# Patient Record
Sex: FEMALE | Race: WHITE | NOT HISPANIC OR LATINO | Employment: OTHER | URBAN - NONMETROPOLITAN AREA
[De-identification: names, ages, dates, MRNs, and addresses within clinical notes are randomized per-mention and may not be internally consistent; named-entity substitution may affect disease eponyms.]

---

## 2020-05-28 ENCOUNTER — HISTORICAL (OUTPATIENT)
Dept: ADMINISTRATIVE | Facility: HOSPITAL | Age: 75
End: 2020-05-28

## 2020-05-28 LAB
ERYTHROCYTE [DISTWIDTH] IN BLOOD BY AUTOMATED COUNT: 12.2 % (ref 11.5–14.5)
ESTRADIOL: 78.7 PG/ML
FSH SERPL-ACNC: 3.5 MIU/ML
HCT VFR BLD AUTO: 41 % (ref 37.7–47.9)
HGB BLD-MCNC: 13.6 G/DL (ref 12.2–16.2)
MCH RBC QN AUTO: 29.4 PG (ref 27–31)
MCHC RBC AUTO-ENTMCNC: 33.2 G% (ref 32–35)
MCV RBC AUTO: 88.7 FL (ref 80–97)
PMV BLD AUTO: 274 10 (ref 142–424)
PMV BLD AUTO: 8.7 FL (ref 7.4–10.4)
RBC # BLD AUTO: 4.62 M/UL (ref 4.04–5.48)
T3FREE SP1 P CHAL SERPL-SCNC: 2.81 PG/ML (ref 2.18–3.98)
T4,THYROXINE: 7.9 UG/DL (ref 4.8–13.9)
TESTOST SERPL-MCNC: 244 NG/DL (ref 3–41)
TSH SERPL DL<=0.005 MIU/L-ACNC: 1.51 UIU/ML (ref 0.36–3.74)
WBC # BLD AUTO: 7.9 10 (ref 4.6–10.2)

## 2020-05-29 LAB — THYROPEROXIDASE ANTIBODIES: <9 IU/ML (ref 0–34)

## 2020-09-22 ENCOUNTER — LAB VISIT (OUTPATIENT)
Dept: LAB | Facility: HOSPITAL | Age: 75
End: 2020-09-22
Attending: FAMILY MEDICINE
Payer: MEDICARE

## 2020-09-22 DIAGNOSIS — E66.8 OBESITY OF ENDOCRINE ORIGIN: Primary | ICD-10-CM

## 2020-09-22 DIAGNOSIS — R53.83 FATIGUE: ICD-10-CM

## 2020-09-22 LAB
BASOPHILS # BLD AUTO: 0.04 K/UL (ref 0–0.2)
BASOPHILS NFR BLD: 0.6 % (ref 0–1.9)
DIFFERENTIAL METHOD: ABNORMAL
EOSINOPHIL # BLD AUTO: 0.1 K/UL (ref 0–0.5)
EOSINOPHIL NFR BLD: 1.7 % (ref 0–8)
ERYTHROCYTE [DISTWIDTH] IN BLOOD BY AUTOMATED COUNT: 12.1 % (ref 11.5–14.5)
HCT VFR BLD AUTO: 39.8 % (ref 37–48.5)
HGB BLD-MCNC: 12.7 G/DL (ref 12–16)
IMM GRANULOCYTES # BLD AUTO: 0.01 K/UL (ref 0–0.04)
IMM GRANULOCYTES NFR BLD AUTO: 0.1 % (ref 0–0.5)
LYMPHOCYTES # BLD AUTO: 1.8 K/UL (ref 1–4.8)
LYMPHOCYTES NFR BLD: 24.6 % (ref 18–48)
MCH RBC QN AUTO: 28.5 PG (ref 27–31)
MCHC RBC AUTO-ENTMCNC: 31.9 G/DL (ref 32–36)
MCV RBC AUTO: 89 FL (ref 82–98)
MONOCYTES # BLD AUTO: 0.7 K/UL (ref 0.3–1)
MONOCYTES NFR BLD: 9.6 % (ref 4–15)
NEUTROPHILS # BLD AUTO: 4.5 K/UL (ref 1.8–7.7)
NEUTROPHILS NFR BLD: 63.4 % (ref 38–73)
NRBC BLD-RTO: 0 /100 WBC
PLATELET # BLD AUTO: 277 K/UL (ref 150–350)
PMV BLD AUTO: 8.6 FL (ref 9.2–12.9)
RBC # BLD AUTO: 4.46 M/UL (ref 4–5.4)
T4 SERPL-MCNC: 9.1 UG/DL (ref 4.5–11.5)
TSH SERPL DL<=0.005 MIU/L-ACNC: 0.62 UIU/ML (ref 0.4–4)
WBC # BLD AUTO: 7.16 K/UL (ref 3.9–12.7)

## 2020-09-22 PROCEDURE — 84436 ASSAY OF TOTAL THYROXINE: CPT

## 2020-09-22 PROCEDURE — 83001 ASSAY OF GONADOTROPIN (FSH): CPT

## 2020-09-22 PROCEDURE — 85025 COMPLETE CBC W/AUTO DIFF WBC: CPT

## 2020-09-22 PROCEDURE — 82670 ASSAY OF TOTAL ESTRADIOL: CPT

## 2020-09-22 PROCEDURE — 84481 FREE ASSAY (FT-3): CPT

## 2020-09-22 PROCEDURE — 84443 ASSAY THYROID STIM HORMONE: CPT

## 2020-09-22 PROCEDURE — 84403 ASSAY OF TOTAL TESTOSTERONE: CPT

## 2020-09-22 PROCEDURE — 36415 COLL VENOUS BLD VENIPUNCTURE: CPT

## 2020-09-23 LAB
ESTRADIOL SERPL-MCNC: 46 PG/ML
FSH SERPL-ACNC: 5.7 MIU/ML
T3FREE SERPL-MCNC: 2.4 PG/ML (ref 2.3–4.2)
TESTOST SERPL-MCNC: 133 NG/DL (ref 5–73)
THYROPEROXIDASE IGG SERPL-ACNC: <6 IU/ML

## 2020-10-16 ENCOUNTER — HOSPITAL ENCOUNTER (INPATIENT)
Facility: HOSPITAL | Age: 75
LOS: 4 days | Discharge: HOME | DRG: 885 | End: 2020-10-20
Attending: EMERGENCY MEDICINE | Admitting: EMERGENCY MEDICINE
Payer: MEDICARE

## 2020-10-16 DIAGNOSIS — F32.A DEPRESSION, UNSPECIFIED DEPRESSION TYPE: Primary | ICD-10-CM

## 2020-10-16 DIAGNOSIS — F23 ACUTE PSYCHOSIS: ICD-10-CM

## 2020-10-16 LAB
ALBUMIN SERPL BCP-MCNC: 4.1 G/DL (ref 3.5–5.2)
ALP SERPL-CCNC: 62 U/L (ref 55–135)
ALT SERPL W/O P-5'-P-CCNC: 22 U/L (ref 10–44)
AMPHET+METHAMPHET UR QL: NEGATIVE
ANION GAP SERPL CALC-SCNC: 8 MMOL/L (ref 8–16)
APAP SERPL-MCNC: <10 UG/ML (ref 10–20)
AST SERPL-CCNC: 15 U/L (ref 10–40)
BACTERIA #/AREA URNS HPF: ABNORMAL /HPF
BARBITURATES UR QL SCN>200 NG/ML: NEGATIVE
BASOPHILS # BLD AUTO: 0.05 K/UL (ref 0–0.2)
BASOPHILS NFR BLD: 0.7 % (ref 0–1.9)
BENZODIAZ UR QL SCN>200 NG/ML: NEGATIVE
BILIRUB SERPL-MCNC: 1.1 MG/DL (ref 0.1–1)
BILIRUB UR QL STRIP: NEGATIVE
BUN SERPL-MCNC: 18 MG/DL (ref 8–23)
BZE UR QL SCN: NEGATIVE
CALCIUM SERPL-MCNC: 8.9 MG/DL (ref 8.7–10.5)
CANNABINOIDS UR QL SCN: NEGATIVE
CHLORIDE SERPL-SCNC: 106 MMOL/L (ref 95–110)
CLARITY UR: ABNORMAL
CO2 SERPL-SCNC: 25 MMOL/L (ref 23–29)
COLOR UR: YELLOW
CREAT SERPL-MCNC: 0.7 MG/DL (ref 0.5–1.4)
CREAT UR-MCNC: 188 MG/DL (ref 15–325)
DIFFERENTIAL METHOD: ABNORMAL
EOSINOPHIL # BLD AUTO: 0.1 K/UL (ref 0–0.5)
EOSINOPHIL NFR BLD: 1.4 % (ref 0–8)
ERYTHROCYTE [DISTWIDTH] IN BLOOD BY AUTOMATED COUNT: 12 % (ref 11.5–14.5)
EST. GFR  (AFRICAN AMERICAN): >60 ML/MIN/1.73 M^2
EST. GFR  (NON AFRICAN AMERICAN): >60 ML/MIN/1.73 M^2
ESTIMATED AVG GLUCOSE: 105 MG/DL (ref 68–131)
ETHANOL SERPL-MCNC: <3 MG/DL
GLUCOSE SERPL-MCNC: 103 MG/DL (ref 70–110)
GLUCOSE UR QL STRIP: NEGATIVE
HBA1C MFR BLD HPLC: 5.3 % (ref 4–5.6)
HCT VFR BLD AUTO: 40.6 % (ref 37–48.5)
HGB BLD-MCNC: 13.3 G/DL (ref 12–16)
HGB UR QL STRIP: NEGATIVE
HYALINE CASTS #/AREA URNS LPF: 2 /LPF
IMM GRANULOCYTES # BLD AUTO: 0.02 K/UL (ref 0–0.04)
IMM GRANULOCYTES NFR BLD AUTO: 0.3 % (ref 0–0.5)
KETONES UR QL STRIP: ABNORMAL
LEUKOCYTE ESTERASE UR QL STRIP: ABNORMAL
LYMPHOCYTES # BLD AUTO: 1.4 K/UL (ref 1–4.8)
LYMPHOCYTES NFR BLD: 20.1 % (ref 18–48)
MCH RBC QN AUTO: 29 PG (ref 27–31)
MCHC RBC AUTO-ENTMCNC: 32.8 G/DL (ref 32–36)
MCV RBC AUTO: 89 FL (ref 82–98)
METHADONE UR QL SCN>300 NG/ML: NEGATIVE
MICROSCOPIC COMMENT: ABNORMAL
MONOCYTES # BLD AUTO: 0.6 K/UL (ref 0.3–1)
MONOCYTES NFR BLD: 8.4 % (ref 4–15)
NEUTROPHILS # BLD AUTO: 4.8 K/UL (ref 1.8–7.7)
NEUTROPHILS NFR BLD: 69.1 % (ref 38–73)
NITRITE UR QL STRIP: POSITIVE
NRBC BLD-RTO: 0 /100 WBC
OPIATES UR QL SCN: NEGATIVE
PCP UR QL SCN>25 NG/ML: NEGATIVE
PH UR STRIP: 5 [PH] (ref 5–8)
PLATELET # BLD AUTO: 286 K/UL (ref 150–350)
PMV BLD AUTO: 8.6 FL (ref 9.2–12.9)
POTASSIUM SERPL-SCNC: 3.9 MMOL/L (ref 3.5–5.1)
PROT SERPL-MCNC: 7.5 G/DL (ref 6–8.4)
PROT UR QL STRIP: NEGATIVE
RBC # BLD AUTO: 4.59 M/UL (ref 4–5.4)
RBC #/AREA URNS HPF: 6 /HPF (ref 0–4)
SARS-COV-2 RDRP RESP QL NAA+PROBE: NEGATIVE
SODIUM SERPL-SCNC: 139 MMOL/L (ref 136–145)
SP GR UR STRIP: 1.02 (ref 1–1.03)
SQUAMOUS #/AREA URNS HPF: 10 /HPF
TOXICOLOGY INFORMATION: NORMAL
TSH SERPL DL<=0.005 MIU/L-ACNC: 0.66 UIU/ML (ref 0.4–4)
URN SPEC COLLECT METH UR: ABNORMAL
UROBILINOGEN UR STRIP-ACNC: 1 EU/DL
WBC # BLD AUTO: 6.93 K/UL (ref 3.9–12.7)
WBC #/AREA URNS HPF: 15 /HPF (ref 0–5)

## 2020-10-16 PROCEDURE — 80053 COMPREHEN METABOLIC PANEL: CPT

## 2020-10-16 PROCEDURE — 80320 DRUG SCREEN QUANTALCOHOLS: CPT

## 2020-10-16 PROCEDURE — 87086 URINE CULTURE/COLONY COUNT: CPT

## 2020-10-16 PROCEDURE — 87077 CULTURE AEROBIC IDENTIFY: CPT

## 2020-10-16 PROCEDURE — 83036 HEMOGLOBIN GLYCOSYLATED A1C: CPT

## 2020-10-16 PROCEDURE — 81000 URINALYSIS NONAUTO W/SCOPE: CPT | Mod: 59

## 2020-10-16 PROCEDURE — 84443 ASSAY THYROID STIM HORMONE: CPT

## 2020-10-16 PROCEDURE — 36415 COLL VENOUS BLD VENIPUNCTURE: CPT

## 2020-10-16 PROCEDURE — 11400000 HC PSYCH PRIVATE ROOM

## 2020-10-16 PROCEDURE — 99285 EMERGENCY DEPT VISIT HI MDM: CPT

## 2020-10-16 PROCEDURE — 87088 URINE BACTERIA CULTURE: CPT

## 2020-10-16 PROCEDURE — 87186 SC STD MICRODIL/AGAR DIL: CPT

## 2020-10-16 PROCEDURE — 25000003 PHARM REV CODE 250: Performed by: EMERGENCY MEDICINE

## 2020-10-16 PROCEDURE — 80307 DRUG TEST PRSMV CHEM ANLYZR: CPT

## 2020-10-16 PROCEDURE — U0002 COVID-19 LAB TEST NON-CDC: HCPCS

## 2020-10-16 PROCEDURE — 80329 ANALGESICS NON-OPIOID 1 OR 2: CPT

## 2020-10-16 PROCEDURE — 85025 COMPLETE CBC W/AUTO DIFF WBC: CPT

## 2020-10-16 RX ORDER — LORAZEPAM 0.5 MG/1
0.5 TABLET ORAL EVERY 6 HOURS PRN
Status: DISCONTINUED | OUTPATIENT
Start: 2020-10-16 | End: 2020-10-20 | Stop reason: HOSPADM

## 2020-10-16 RX ORDER — VENLAFAXINE HYDROCHLORIDE 150 MG/1
CAPSULE, EXTENDED RELEASE ORAL
Status: ON HOLD | COMMUNITY
Start: 2020-07-17 | End: 2020-10-20 | Stop reason: HOSPADM

## 2020-10-16 RX ORDER — METOPROLOL SUCCINATE 50 MG/1
TABLET, EXTENDED RELEASE ORAL
COMMUNITY
Start: 2020-09-22 | End: 2021-02-17

## 2020-10-16 RX ORDER — RISPERIDONE 1 MG/1
1 TABLET ORAL
Status: COMPLETED | OUTPATIENT
Start: 2020-10-16 | End: 2020-10-16

## 2020-10-16 RX ORDER — DIPHENHYDRAMINE HCL 25 MG
25 CAPSULE ORAL NIGHTLY PRN
Status: DISCONTINUED | OUTPATIENT
Start: 2020-10-16 | End: 2020-10-20 | Stop reason: HOSPADM

## 2020-10-16 RX ORDER — TIMOLOL MALEATE 5 MG/ML
1 SOLUTION/ DROPS OPHTHALMIC DAILY
Status: DISCONTINUED | OUTPATIENT
Start: 2020-10-17 | End: 2020-10-20 | Stop reason: HOSPADM

## 2020-10-16 RX ORDER — VENLAFAXINE 25 MG/1
TABLET ORAL 2 TIMES DAILY
COMMUNITY
End: 2020-10-16

## 2020-10-16 RX ORDER — METOPROLOL SUCCINATE 25 MG/1
50 TABLET, EXTENDED RELEASE ORAL DAILY
Status: DISCONTINUED | OUTPATIENT
Start: 2020-10-17 | End: 2020-10-20 | Stop reason: HOSPADM

## 2020-10-16 RX ORDER — FOLIC ACID 1 MG/1
1 TABLET ORAL DAILY
Status: DISCONTINUED | OUTPATIENT
Start: 2020-10-17 | End: 2020-10-20 | Stop reason: HOSPADM

## 2020-10-16 RX ORDER — ACETAMINOPHEN 325 MG/1
650 TABLET ORAL EVERY 6 HOURS PRN
Status: DISCONTINUED | OUTPATIENT
Start: 2020-10-16 | End: 2020-10-20 | Stop reason: HOSPADM

## 2020-10-16 RX ORDER — VENLAFAXINE HYDROCHLORIDE 75 MG/1
150 CAPSULE, EXTENDED RELEASE ORAL DAILY
Status: DISCONTINUED | OUTPATIENT
Start: 2020-10-17 | End: 2020-10-17

## 2020-10-16 RX ADMIN — RISPERIDONE 1 MG: 1 TABLET, FILM COATED ORAL at 02:10

## 2020-10-16 NOTE — ED NOTES
NEUROLOGICAL:   Patient is awake , alert  and oriented x 4 . Pupils are PERRL. Gait is steady.   Moves all extremities without difficulty.   Patient reports no neuro complaints..  GCS 15    HEENT:   Head appears normocephalic  and symmetric .   Eyes appear red and watery to both eyes. Left eye cloudiness, decreased movement noted.  Ears appear WNL. Patient reports no complaints  to both ears.   Nares appear patent . Patient reports no nose complaints .  Mouth appears moist, pink and teeth intact. Patient reports no mouth complaints.   Throat appears pink and moist . Patient reports no throat complaints.    CARDIOVASCULAR:   S1 and S2 present, no murmurs, gallops, or rubs, rate regular  and pulses palpable (2+)    On palpation no edema noted , noted to none.   Patient reports no CV complaints.  .   Patient vitals are WNL.    RESPIRATORY:   Airway Clear, Open, and Patent.  Respirations are even and unlabored.   Breath sounds clear  to all lung fields.   Patient reports no respiratory complaints.     GASTROINTESTINAL:   Abdomen is soft  and non-tender x 4 quadrants. Bowel sounds are normoactive to all quadrants .   Patient reports no GI complaints .     GENITOURINARY:   Patient reports no  complaints.     MUSCULOSKELETAL:   full range of motion to all extremities, no swelling noted , no tenderness noted and no weakness noted.   Patient reports no musculoskeletal complaints     SKIN:   Skin appears warm , dry , good turgor, color normal for race and intact. Patient reports no skin complaints.

## 2020-10-16 NOTE — ED PROVIDER NOTES
Encounter Date: 10/16/2020       History     Chief Complaint   Patient presents with    Mental Health Problem     patient hasn't been sleeping for a couple days, having mental break down, stuck on worrying about wash clothes, patient tearful. patient denies SI/HI     This is a 74-year-old white female history of depression and past, history of psychiatric admissions in the past, most recently was 3-4 years ago had [facility].  Patient complaining of depression, uncontrollable crying, obsessive thoughts.  Denies HI denies SI.  Patient states she has not slept in 3 days        Review of patient's allergies indicates:   Allergen Reactions    Morphine Itching     Past Medical History:   Diagnosis Date    Cataract     Right Eye    Depression     Glaucoma      Past Surgical History:   Procedure Laterality Date    CATARACT EXTRACTION  1985    Dr. Jovan Cadena   Left Eye    CORNEAL TRANSPLANT  1993     Dr. Jovan Cadena  Left Eye    PENETRATING KERATOPLASTY      STRABISMUS SURGERY  1999    Dr. Jovan Cadena Left Eye    VITRECTOMY       Family History   Problem Relation Age of Onset    Cancer Mother 76        Colon    Cancer Father 62        prostate    Hypertension Father     Macular degeneration Father     Cancer Sister 9        brain tumor    Diabetes Paternal Aunt     Diabetes Maternal Grandmother      Social History     Tobacco Use    Smoking status: Never Smoker   Substance Use Topics    Alcohol use: Yes     Alcohol/week: 0.0 standard drinks    Drug use: No     Review of Systems   Constitutional: Negative for fever.   HENT: Negative for sore throat.    Respiratory: Negative for shortness of breath.    Cardiovascular: Negative for chest pain.   Gastrointestinal: Negative for nausea.   Genitourinary: Negative for dysuria.   Musculoskeletal: Negative for back pain.   Skin: Negative for rash.   Neurological: Negative for weakness.   Hematological: Does not bruise/bleed easily.   Psychiatric/Behavioral:  Positive for sleep disturbance. Negative for suicidal ideas.        Depressed   All other systems reviewed and are negative.      Physical Exam     Initial Vitals [10/16/20 1305]   BP Pulse Resp Temp SpO2   (!) 140/80 82 18 98.1 °F (36.7 °C) 95 %      MAP       --         Physical Exam    Nursing note and vitals reviewed.  Constitutional: She appears well-developed and well-nourished. She is not diaphoretic. No distress.   HENT:   Head: Normocephalic and atraumatic.   Eyes: EOM are normal.   Cataract noted to left eye   Neck: Normal range of motion. Neck supple.   Cardiovascular: Normal rate, regular rhythm, normal heart sounds and intact distal pulses.   No murmur heard.  Pulmonary/Chest: Breath sounds normal. No respiratory distress. She has no wheezes. She has no rhonchi. She has no rales. She exhibits no tenderness.   Abdominal: Soft. Bowel sounds are normal.   Musculoskeletal: Normal range of motion. No tenderness or edema.   Neurological: She is alert and oriented to person, place, and time. She has normal strength and normal reflexes. No cranial nerve deficit. GCS score is 15. GCS eye subscore is 4. GCS verbal subscore is 5. GCS motor subscore is 6.   Skin: Skin is warm and dry. Capillary refill takes less than 2 seconds.   Psychiatric:   Patient tearful, crying, depressed mood.  Denies SI denies HI.  Obsessive thinking.         ED Course   Procedures  Labs Reviewed   CBC W/ AUTO DIFFERENTIAL - Abnormal; Notable for the following components:       Result Value    MPV 8.6 (*)     All other components within normal limits   COMPREHENSIVE METABOLIC PANEL - Abnormal; Notable for the following components:    Total Bilirubin 1.1 (*)     All other components within normal limits   TSH   ALCOHOL,MEDICAL (ETHANOL)   ACETAMINOPHEN LEVEL   SARS-COV-2 RNA AMPLIFICATION, QUAL   URINALYSIS, REFLEX TO URINE CULTURE   DRUG SCREEN PANEL, URINE EMERGENCY          Imaging Results    None          Medical Decision Making:    Differential Diagnosis:   Depressive disorder                   ED Course as of Oct 16 1416   Fri Oct 16, 2020   1408 Psychiatric counselor here to see patient.  Request pec and admission for psychiatric treatment     [SD]      ED Course User Index  [SD] Delvin Ruiz MD            Clinical Impression:     ICD-10-CM ICD-9-CM   1. Depression, unspecified depression type  F32.9 311   2. Acute psychosis  F23 298.9                          ED Disposition Condition    Admit                             Delvin Ruiz MD  10/16/20 8938

## 2020-10-16 NOTE — PLAN OF CARE
"Patient admitted to [facility] under the care of Dr. Jason Rodriguez with diagnosis of Major Depression. [facility] Mrs. Kaya Martinez is a 75yo WF who presented to the ER saying," I'm scared. The thoughts won't go away. I'm gonna be like this forever. It's not gonna go away! This is the end of my life. I know it. Someone will have to take care of my ."  States," I was throwing away washcloths and I was thinking how much it would cost me to replace them. This is going to kill me. I already know. I'm just going to keep crying." No sleep x 3 days.    Patient withdrawn, depressed, cooperative, and anxious. Endorses  racing thoughts . Denies Suicidal Ideation, Homicidal Ideation, Auditory Hallucinations, Visual Hallucinations, Tactile Hallucinations, and Gustatory Hallucinations.      Patient assessed and oriented to room unit and routine. Patient denies pain or discomfort at present and remains injury-free. Safe environment provided.    Kal Headley RN   "

## 2020-10-17 LAB
CHOLEST SERPL-MCNC: 195 MG/DL (ref 120–199)
CHOLEST/HDLC SERPL: 3 {RATIO} (ref 2–5)
HDLC SERPL-MCNC: 66 MG/DL (ref 40–75)
HDLC SERPL: 33.8 % (ref 20–50)
LDLC SERPL CALC-MCNC: 112 MG/DL (ref 63–159)
NONHDLC SERPL-MCNC: 129 MG/DL
TRIGL SERPL-MCNC: 85 MG/DL (ref 30–150)

## 2020-10-17 PROCEDURE — 25000003 PHARM REV CODE 250: Performed by: EMERGENCY MEDICINE

## 2020-10-17 PROCEDURE — 36415 COLL VENOUS BLD VENIPUNCTURE: CPT

## 2020-10-17 PROCEDURE — 99222 1ST HOSP IP/OBS MODERATE 55: CPT | Mod: ,,, | Performed by: PSYCHIATRY & NEUROLOGY

## 2020-10-17 PROCEDURE — 11400000 HC PSYCH PRIVATE ROOM

## 2020-10-17 PROCEDURE — 25000003 PHARM REV CODE 250: Performed by: PSYCHIATRY & NEUROLOGY

## 2020-10-17 PROCEDURE — 80061 LIPID PANEL: CPT

## 2020-10-17 PROCEDURE — 99222 PR INITIAL HOSPITAL CARE,LEVL II: ICD-10-PCS | Mod: ,,, | Performed by: PSYCHIATRY & NEUROLOGY

## 2020-10-17 RX ORDER — VENLAFAXINE HYDROCHLORIDE 75 MG/1
225 CAPSULE, EXTENDED RELEASE ORAL DAILY
Status: DISCONTINUED | OUTPATIENT
Start: 2020-10-18 | End: 2020-10-20 | Stop reason: HOSPADM

## 2020-10-17 RX ORDER — MIRTAZAPINE 15 MG/1
15 TABLET, FILM COATED ORAL NIGHTLY
Status: DISCONTINUED | OUTPATIENT
Start: 2020-10-17 | End: 2020-10-18

## 2020-10-17 RX ADMIN — VENLAFAXINE HYDROCHLORIDE 150 MG: 75 CAPSULE, EXTENDED RELEASE ORAL at 09:10

## 2020-10-17 RX ADMIN — MIRTAZAPINE 15 MG: 15 TABLET, FILM COATED ORAL at 08:10

## 2020-10-17 RX ADMIN — METOPROLOL SUCCINATE 50 MG: 25 TABLET, EXTENDED RELEASE ORAL at 09:10

## 2020-10-17 RX ADMIN — THERA TABS 1 TABLET: TAB at 09:10

## 2020-10-17 RX ADMIN — FOLIC ACID 1 MG: 1 TABLET ORAL at 09:10

## 2020-10-17 NOTE — PLAN OF CARE
Pt on unit for snack, ate 100%. Coopertive interact with staff. Took meds with no adverse reaction. Denies SI/HI and A/V hallucination. Amb on unit independently. No negative behaviors.Will continue to monitor for changes and safety. SOPHY UMANA

## 2020-10-17 NOTE — H&P
"PSYCHIATRY INPATIENT H&P NOTE      10/17/2020 10:45 AM   Kaya Martinez   1945   [id number]           DATE OF ADMISSION: 10/16/2020  1:02 PM    SITE: [facility]    HISTORY    Per ED MD:  Chief Complaint   Patient presents with    Mental Health Problem       patient hasn't been sleeping for a couple days, having mental break down, stuck on worrying about wash clothes, patient tearful. patient denies SI/HI   This is a 74-year-old white female history of depression and past, history of psychiatric admissions in the past, most recently was 3-4 years ago had [facility].  Patient complaining of depression, uncontrollable crying, obsessive thoughts.  Denies HI denies SI.  Patient states she has not slept in 3 days    Chief Complaint / Reason for Psychiatric Admission: Depression, Insomnia, and Grave Disability ("I haven't slept in 3 days")      HPI   Kaya Martinez is a 74 y.o. female with a past medical history as noted below and a past psychiatric history of MDD.  The patient was seen and examined.  The chart was reviewed.  On examination today, the patient endorses that she has a long history of MDD beginning in her 30s that she was doing fairly well on a regimen of Effexor  mg PO daily (prescribed by her psychiatrist, Dr. Hyatt, in [place], [place]) up until about a month ago.  She states that about one month ago, her  had a stroke, and he has been requiring significant care with ADLs since then.  She states that this increased stress has resulted in a worsening in her depression with an inability to sleep over the past three days.  She also endorses a reduction in attention to ADLs as a result of poor sleep, increased depression, and uncontrollable crying.  She denies any current/recent passive/active SI/HI.  She endorses compliance with her outpatient regimen of Effexor XR and denies any adverse effects to her current medication regimen.  Regarding current medical/physical complaints, she endorses a " chronic issue of L eye blindness. She denies any other medical complaints at this time.  NAD was observed during the examination other than crying spells.  She endorses an OK appetite.  She denies any hx of silvia or psychosis.  See detailed psychiatric ROS below.  After discussing the risks, benefits, alt vs no treatment, she is agreeable and desiring a trial of Remeron 15 mg PO QHS in an effort to improve her sleep/mood in addition to continuing her Effexor  mg PO daily.       Psychiatric Review Of Systems - Currently, the patient is endorsing and/or denying the following:  (patient's endorsements are BOLDED below; if not BOLDED, then patient denied):    Endorses Symptoms of Depression: diminished mood, low motivation, loss of interest/anhedonia, irritability, diminished energy, change in sleep, change in appetite, diminished concentration or cognition or indecisiveness, PMA/R, excessive guilt or hopelessness or worthlessness, suicidal ideations    Endorses trouble with Sleep: initiation, maintenance, early morning awakening with inability to return to sleep    Denies Suicidal/Homicidal ideations: active/passive ideations, organized plans, future intentions    Denies Symptoms of psychosis: hallucinations, delusions, disorganized thinking, disorganized behavior or abnormal motor behavior, or negative symptoms (diminshed emotional expression, avolition, anhedonia, alogia, asociality     Denies Symptoms of silvia or hypomania: elevated, expansive, or irritable mood with increased energy or activity; with inflated self-esteem or grandiosity, decreased need for sleep, increased rate of speech, FOI or racing thoughts, distractibility, increased goal directed activity or PMA, risky/disinhibited behavior    Denies Symptoms of ZAHRAA: excessive anxiety/worry/fear, more days than not, about numerous issues, difficult to control, with restlessness, fatigue, poor concentration, irritability, muscle tension, sleep  disturbance; causes functionally impairing distress     Denies Symptoms of Panic Disorder: recurrent panic attacks, precipitated or un-precipitated, source of worry and/or behavioral changes secondary; with or without agoraphobia    Denies Symptoms of PTSD: h/o trauma; re-experiencing/intrusive symptoms, avoidant behavior, negative alterations in cognition or mood, or hyperarousal symptoms; with or without dissociative symptoms     Denies Symptoms of OCD: obsessions or compulsions     Denies Symptoms of Eating Disorders: anorexia, bulimia or binging    Denies Substance Use/Abuse: intoxication, withdrawal, tolerance, used in larger amounts or duration than intended, unsuccessful attempts to limit or quit, increased time engaging in or seeking out, cravings or strong desire to use, failure to fulfill obligations, negative consequences in social/interpersonal/occupational,/recreational areas, use in dangerous situations, medical or psychological consequences       ROS  General ROS: negative for - chills, fatigue, fever or night sweats  Ophthalmic ROS: negative for - blurry vision, double vision or eye pain  ENT ROS: negative for - sinus pain, headaches, sore throat; positive for chronic blindness in L eye   Allergy and Immunology ROS: negative for - hives, itchy/watery eyes or nasal congestion  Hematological and Lymphatic ROS: negative for - bleeding problems, bruising, jaundice or pallor  Endocrine ROS: negative for - galactorrhea, hot flashes, mood swings, palpitations or temperature intolerance  Respiratory ROS: negative for - cough, hemoptysis, shortness of breath, tachypnea or wheezing  Cardiovascular ROS: negative for - chest pain, dyspnea on exertion, loss of consciousness, palpitations, rapid heart rate or shortness of breath  Gastrointestinal ROS: negative for - appetite loss, nausea, abdominal pain, blood in stools, change in bowel habits, constipation or diarrhea  Genito-Urinary ROS: negative for -  incontinence, nocturia or pelvic pain  Musculoskeletal ROS: negative for - joint stiffness, joint swelling, joint pain or muscle pain   Neurological ROS: negative for - behavioral changes, confusion, dizziness, memory loss, numbness/tingling or seizures  Dermatological ROS: negative for dry skin, hair changes, pruritus or rash  Psychiatric ROS: see detailed psychiatric ROS above in history section       Past Psychiatric History:  Previous Medication Trials: yes, previously on Wellbutrin and other unknown medications   Previous Psychiatric Hospitalizations: yes, once at [facility] for depression in 2016  Previous Suicide Attempts: denies  History of Violence: denies  Outpatient Psychiatrist: sees Dr. Hyatt at [facility] (last saw him about 2 months via telemedicine)    Social History:  Marital Status:  for 50+ years   Children: 3 adult children (good relationship)   Employment Status/Info: retired from [employer] work   Education: high school diploma   Special Ed: denies   : denies  Mandaeism: Latter-day   Housing Status: living in [place] with  at home   Hobbies/Leisure time: go to movies and work in yard  History of phys/sexual abuse: denies   Access to gun: denies     Family Psychiatric History: paternal grandmother with schizophrenia     Substance Abuse History:  Recreational Drugs: denies  Use of Alcohol: denies   Rehab History: denies   Tobacco Use: denies  Use of Caffeine: coffee 1 cup /day  Use of OTC: denies   Legal consequences of chemical use: denies     Legal History:  Past Charges/Incarcerations: denies   Pending charges: denies     Psychosocial Stressors: taking care of  post CVA  Functioning Relationships: good support system and good relationship with children (daughter in  and sons in [place])  Strengths AND Liabilities  Strength: Patient accepts guidance/feedback, Strength: Patient is expressive/articulate., Strength: Patient is motivated for change.      PAST MEDICAL &  SURGICAL HISTORY   Past Medical History:   Diagnosis Date    Cataract     Right Eye    Depression     Glaucoma      Past Surgical History:   Procedure Laterality Date    CATARACT EXTRACTION  1985    Dr. Jovan Cadena   Left Eye    CORNEAL TRANSPLANT  1993     Dr. Jovan Cadena  Left Eye    PENETRATING KERATOPLASTY      STRABISMUS SURGERY  1999    Dr. Jovan Cadena Left Eye    VITRECTOMY         NEUROLOGIC HISTORY  Seizures: denies  Head trauma: denies     FAMILY HISTORY   Family History   Problem Relation Age of Onset    Cancer Mother 76        Colon    Cancer Father 62        prostate    Hypertension Father     Macular degeneration Father     Cancer Sister 9        brain tumor    Diabetes Paternal Aunt     Diabetes Maternal Grandmother        ALLERGIES   Review of patient's allergies indicates:   Allergen Reactions    Morphine Itching       CURRENT MEDICATION REGIMEN   Home Meds:   Prior to Admission medications    Medication Sig Start Date End Date Taking? Authorizing Provider   metoprolol succinate (TOPROL-XL) 50 MG 24 hr tablet  9/22/20   Historical Provider   timolol maleate 0.5% (TIMOPTIC-XR) 0.5 % SolG Place 1 drop into the left eye once daily.      Tone Velasco III, MD   venlafaxine (EFFEXOR-XR) 150 MG Cp24  7/17/20   Historical Provider       OTC Meds: denies     Scheduled Meds:    folic acid  1 mg Oral Daily    metoprolol succinate  50 mg Oral Daily    multivitamin  1 tablet Oral Daily    timolol maleate 0.5%  1 drop Left Eye Daily    venlafaxine  150 mg Oral Daily      PRN Meds: acetaminophen, diphenhydrAMINE, influenza, LORazepam, pneumoc 13-cesar conj-dip cr(PF)   Psychotherapeutics (From admission, onward)    Start     Stop Route Frequency Ordered    10/17/20 0900  venlafaxine 24 hr capsule 150 mg      -- Oral Daily 10/16/20 1607    10/16/20 1806  LORazepam tablet 0.5 mg      -- Oral Every 6 hours PRN 10/16/20 1706          LABORATORY DATA   Recent Results (from the past 72  hour(s))   COVID-19 Rapid Screening    Collection Time: 10/16/20  1:20 PM   Result Value Ref Range    SARS-CoV-2 RNA, Amplification, Qual Negative Negative   CBC auto differential    Collection Time: 10/16/20  1:22 PM   Result Value Ref Range    WBC 6.93 3.90 - 12.70 K/uL    RBC 4.59 4.00 - 5.40 M/uL    Hemoglobin 13.3 12.0 - 16.0 g/dL    Hematocrit 40.6 37.0 - 48.5 %    Mean Corpuscular Volume 89 82 - 98 fL    Mean Corpuscular Hemoglobin 29.0 27.0 - 31.0 pg    Mean Corpuscular Hemoglobin Conc 32.8 32.0 - 36.0 g/dL    RDW 12.0 11.5 - 14.5 %    Platelets 286 150 - 350 K/uL    MPV 8.6 (L) 9.2 - 12.9 fL    Immature Granulocytes 0.3 0.0 - 0.5 %    Gran # (ANC) 4.8 1.8 - 7.7 K/uL    Immature Grans (Abs) 0.02 0.00 - 0.04 K/uL    Lymph # 1.4 1.0 - 4.8 K/uL    Mono # 0.6 0.3 - 1.0 K/uL    Eos # 0.1 0.0 - 0.5 K/uL    Baso # 0.05 0.00 - 0.20 K/uL    nRBC 0 0 /100 WBC    Gran% 69.1 38.0 - 73.0 %    Lymph% 20.1 18.0 - 48.0 %    Mono% 8.4 4.0 - 15.0 %    Eosinophil% 1.4 0.0 - 8.0 %    Basophil% 0.7 0.0 - 1.9 %    Differential Method Automated    Acetaminophen level    Collection Time: 10/16/20  1:22 PM   Result Value Ref Range    Acetaminophen (Tylenol), Serum <10.0 10.0 - 20.0 ug/mL   Hemoglobin A1c    Collection Time: 10/16/20  1:22 PM   Result Value Ref Range    Hemoglobin A1C 5.3 4.0 - 5.6 %    Estimated Avg Glucose 105 68 - 131 mg/dL   Comprehensive metabolic panel    Collection Time: 10/16/20  1:23 PM   Result Value Ref Range    Sodium 139 136 - 145 mmol/L    Potassium 3.9 3.5 - 5.1 mmol/L    Chloride 106 95 - 110 mmol/L    CO2 25 23 - 29 mmol/L    Glucose 103 70 - 110 mg/dL    BUN, Bld 18 8 - 23 mg/dL    Creatinine 0.7 0.5 - 1.4 mg/dL    Calcium 8.9 8.7 - 10.5 mg/dL    Total Protein 7.5 6.0 - 8.4 g/dL    Albumin 4.1 3.5 - 5.2 g/dL    Total Bilirubin 1.1 (H) 0.1 - 1.0 mg/dL    Alkaline Phosphatase 62 55 - 135 U/L    AST 15 10 - 40 U/L    ALT 22 10 - 44 U/L    Anion Gap 8 8 - 16 mmol/L    eGFR if African American >60.0 >60  mL/min/1.73 m^2    eGFR if non African American >60.0 >60 mL/min/1.73 m^2   TSH    Collection Time: 10/16/20  1:23 PM   Result Value Ref Range    TSH 0.655 0.400 - 4.000 uIU/mL   Ethanol    Collection Time: 10/16/20  1:23 PM   Result Value Ref Range    Alcohol, Medical, Serum <3 <10 mg/dL   Urinalysis, Reflex to Urine Culture Urine, Clean Catch    Collection Time: 10/16/20  2:23 PM    Specimen: Urine   Result Value Ref Range    Specimen UA Urine, Clean Catch     Color, UA Yellow Yellow, Straw, [place]    Appearance, UA Cloudy (A) Clear    pH, UA 5.0 5.0 - 8.0    Specific Gravity, UA 1.020 1.005 - 1.030    Protein, UA Negative Negative    Glucose, UA Negative Negative    Ketones, UA 2+ (A) Negative    Bilirubin (UA) Negative Negative    Occult Blood UA Negative Negative    Nitrite, UA Positive (A) Negative    Urobilinogen, UA 1.0 <2.0 EU/dL    Leukocytes, UA 1+ (A) Negative   Drug screen panel, emergency    Collection Time: 10/16/20  2:23 PM   Result Value Ref Range    Benzodiazepines Negative     Methadone metabolites Negative     Cocaine (Metab.) Negative     Opiate Scrn, Ur Negative     Barbiturate Screen, Ur Negative     Amphetamine Screen, Ur Negative     THC Negative     Phencyclidine Negative     Creatinine, Random Ur 188.0 15.0 - 325.0 mg/dL    Toxicology Information SEE COMMENT    Urinalysis Microscopic    Collection Time: 10/16/20  2:23 PM   Result Value Ref Range    RBC, UA 6 (H) 0 - 4 /hpf    WBC, UA 15 (H) 0 - 5 /hpf    Bacteria Many (A) None-Occ /hpf    Squam Epithel, UA 10 /hpf    Hyaline Casts, UA 2 (A) 0-1/lpf /lpf    Microscopic Comment SEE COMMENT    Lipid panel    Collection Time: 10/17/20  5:45 AM   Result Value Ref Range    Cholesterol 195 120 - 199 mg/dL    Triglycerides 85 30 - 150 mg/dL    HDL 66 40 - 75 mg/dL    LDL Cholesterol 112.0 63.0 - 159.0 mg/dL    Hdl/Cholesterol Ratio 33.8 20.0 - 50.0 %    Total Cholesterol/HDL Ratio 3.0 2.0 - 5.0    Non-HDL Cholesterol 129 mg/dL      No results found  "for: PHENYTOIN, PHENOBARB, VALPROATE, CBMZ      EXAMINATION    Physical Exam (reviewed note/exam by Dr. John MD with Hospital Medicine Team from 10/17/20)  Constitutional:       Appearance: Normal appearance.   HENT:      Head: Normocephalic and atraumatic.      Right Ear: Tympanic membrane normal.      Left Ear: Tympanic membrane normal.      Nose: Nose normal.      Mouth/Throat:      Mouth: Mucous membranes are dry.   Eyes:      Pupils: Pupils are equal, round, and reactive to light.   Neck:      Musculoskeletal: Normal range of motion and neck supple.   Cardiovascular:      Rate and Rhythm: Regular rhythm. Tachycardia present.      Pulses: Normal pulses.      Heart sounds: Normal heart sounds.   Pulmonary:      Breath sounds: Normal breath sounds.   Abdominal:      General: Abdomen is flat. Bowel sounds are normal.      Palpations: Abdomen is soft.   Musculoskeletal: Normal range of motion.   Skin:     General: Skin is warm and dry.      Capillary Refill: Capillary refill takes less than 2 seconds.   Neurological:      General: No focal deficit present.      Mental Status: She is alert.   Psychiatric:         Mood and Affect: Mood normal.         Behavior: Behavior normal.      CRANIAL NERVES   CN III, IV, VI   Pupils are equal, round, and reactive to light.    PAIN  0/10  Subjective report of pain matches objective signs and symptoms: Yes    VITALS   Vitals:    10/16/20 1501 10/16/20 1502 10/16/20 1912 10/17/20 0753   BP: (!) 159/87 (!) 156/72 (!) 149/73 137/80   BP Location: Right arm Right arm Right arm    Patient Position: Sitting Lying Lying Standing   Pulse: 94 89 86 98   Resp: 20 20 16 20   Temp: 98 °F (36.7 °C) 98.1 °F (36.7 °C) 98.2 °F (36.8 °C) 97.9 °F (36.6 °C)   TempSrc: Oral Oral Oral Oral   SpO2: 98% 97% 97% (!) 94%   Weight:  88 kg (194 lb)     Height:  5' 4" (1.626 m)          CONSTITUTIONAL  General Appearance: NAD, unremarkable, age appropriate, overweight, seated in " "chair    MUSCULOSKELETAL  Muscle Strength and Tone: WNL   Abnormal Involuntary Movements: none observed   Gait and Station: WNL; non-ataxic     PSYCHIATRIC   Behavior/Cooperation:  cooperative, eye contact minimal, tearful   Speech:  normal rate, normal pitch, normal volume, monotone  Language: grossly intact, able to name, able to repeat with spontaneous speech  Mood: "OK; worried about my "  Affect:  Constricted; tearful   Associations: intact; no ARVIN  Thought Process: Linear  Thought Content: no suicidality, no homicidality, no delusions, no paranoia, no AH/VH (no RIS observed)  Sensorium:  Awake  Alert and Oriented: to person, place, situation, day of week, month of year, year  Memory: 3/3 immediate, 3/3 at 5 minutes    Recent:  Intact; able to report recent events   Remote: Intact; Named 4/4 past presidents   Attention/concentration: appropriate for age/education. Able to spell [id number] & [id number]   Similarities: Intact; (difference between apple and orange?)  Abstract reasoning: Intact  Insight: Intact  Judgment: Intact    CAM ICU Delirium Assessment - NEGATIVE      Is the patient aware of the biomedical complications associated with substance abuse and mental illness? yes      MEDICAL DECISION MAKING    ASSESSMENT      MDD, recurrent, severe, without psychosis   Insomnia  HTN  Glaucoma     RECOMMENDATIONS       - Patient currently meets PEC/CEC criteria due to being gravely disabled 2/2 mental illness at this time.      - Increase Effexor XR to home regimen of 225 mg PO daily for mood/anxiety and begin Remeron 15 mg PO QHS for sleep/mood/anxiety (discussed risks/benefits/alt vs no treatment with patient)     - Continue Timolol for Glaucoma and Toprol XL for HTN per Hospital Medicine Team     Need for continued hospitalization:  continue inpatient psychiatric hospitalization for further stabilization     Target Disposition:  Hopefully home in 3-5 days         Time spent with patient and/or on unit " managing/coordinating patient's care: 50 minutes      More than 50% of the time was spent counseling/coordinating care        STAFF:  Stevenson Alvarez MD  [facility]  10/17/2020

## 2020-10-17 NOTE — PLAN OF CARE
POC reviewed this shift and is ongoing. Patient withdrawn, depressed, anxious, and crying intermittently .  Denies Suicidal Ideation, Homicidal Ideation, Auditory Hallucinations, Visual Hallucinations, Tactile Hallucinations, and Gustatory Hallucinations. Pt has been isolating the entire shift. No interaction with staff or peers. Cries when she speaks to her  on the phone.    Kal Headley RN

## 2020-10-17 NOTE — H&P
[facility]    History & Physical      Patient Name: Kaya Martinez  MRN: [id number]  Admission Date: 10/16/2020  Attending Physician: Jason Rodriguez MD   Primary Care Provider: Kal Zhang Jr, MD         Patient information was obtained from patient and ER records.     Subjective:     Principal Problem:<principal problem not specified>    Chief Complaint:   Chief Complaint   Patient presents with    Mental Health Problem     patient hasn't been sleeping for a couple days, having mental break down, stuck on worrying about wash clothes, patient tearful. patient denies SI/HI        HPI: 73 y/o female with significant history of glaucoma, htn, depression presented to the ed with complaints of severe depression and anxiety with tearful episodes and further admitted to [facility] for psych eval and treatment and reports no other medical issues at this time. No suicidal or homicidal ideations    Past Medical History:   Diagnosis Date    Cataract     Right Eye    Depression     Glaucoma        Past Surgical History:   Procedure Laterality Date    CATARACT EXTRACTION  1985    Dr. Jovan Cadena   Left Eye    CORNEAL TRANSPLANT  1993     Dr. Jvoan Cadena  Left Eye    PENETRATING KERATOPLASTY      STRABISMUS SURGERY  1999    Dr. Jovan Cadena Left Eye    VITRECTOMY         Review of patient's allergies indicates:   Allergen Reactions    Morphine Itching       No current facility-administered medications on file prior to encounter.      Current Outpatient Medications on File Prior to Encounter   Medication Sig    metoprolol succinate (TOPROL-XL) 50 MG 24 hr tablet     timolol maleate 0.5% (TIMOPTIC-XR) 0.5 % SolG Place 1 drop into the left eye once daily.      venlafaxine (EFFEXOR-XR) 150 MG Cp24      Family History     Problem Relation (Age of Onset)    Cancer Mother (76), Father (62), Sister (9)    Diabetes Paternal Aunt, Maternal Grandmother    Hypertension Father    Macular  degeneration Father        Tobacco Use    Smoking status: Never Smoker   Substance and Sexual Activity    Alcohol use: Yes     Alcohol/week: 0.0 standard drinks    Drug use: No    Sexual activity: Not on file     Review of Systems   HENT: Negative.    Eyes: Negative.    Gastrointestinal: Negative.    Endocrine: Negative.    Genitourinary: Negative.    Musculoskeletal: Negative.    Skin: Negative.    Allergic/Immunologic: Negative.    Neurological: Negative.    Hematological: Negative.    Psychiatric/Behavioral: Positive for decreased concentration. The patient is nervous/anxious.      Objective:     Vital Signs (Most Recent):  Temp: 97.9 °F (36.6 °C) (10/17/20 0753)  Pulse: 98 (10/17/20 0753)  Resp: 20 (10/17/20 0753)  BP: 137/80 (10/17/20 0753)  SpO2: (!) 94 % (10/17/20 0753) Vital Signs (24h Range):  Temp:  [97.9 °F (36.6 °C)-98.3 °F (36.8 °C)] 97.9 °F (36.6 °C)  Pulse:  [] 98  Resp:  [16-20] 20  SpO2:  [94 %-98 %] 94 %  BP: (137-163)/(72-93) 137/80     Weight: 88 kg (194 lb)  Body mass index is 33.3 kg/m².    Physical Exam  Constitutional:       Appearance: Normal appearance.   HENT:      Head: Normocephalic and atraumatic.      Right Ear: Tympanic membrane normal.      Left Ear: Tympanic membrane normal.      Nose: Nose normal.      Mouth/Throat:      Mouth: Mucous membranes are dry.   Eyes:      Pupils: Pupils are equal, round, and reactive to light.   Neck:      Musculoskeletal: Normal range of motion and neck supple.   Cardiovascular:      Rate and Rhythm: Regular rhythm. Tachycardia present.      Pulses: Normal pulses.      Heart sounds: Normal heart sounds.   Pulmonary:      Breath sounds: Normal breath sounds.   Abdominal:      General: Abdomen is flat. Bowel sounds are normal.      Palpations: Abdomen is soft.   Musculoskeletal: Normal range of motion.   Skin:     General: Skin is warm and dry.      Capillary Refill: Capillary refill takes less than 2 seconds.   Neurological:      General: No  focal deficit present.      Mental Status: She is alert.   Psychiatric:         Mood and Affect: Mood normal.         Behavior: Behavior normal.           CRANIAL NERVES     CN III, IV, VI   Pupils are equal, round, and reactive to light.      Significant Labs: All pertinent labs within the past 24 hours have been reviewed.  .lastsbc  Recent Labs   Lab 10/16/20  1323      K 3.9      CO2 25   BUN 18   CREATININE 0.7   CALCIUM 8.9     Lab Results   Component Value Date    WBC 6.93 10/16/2020    HGB 13.3 10/16/2020    HCT 40.6 10/16/2020    MCV 89 10/16/2020     10/16/2020         Significant Imaging: I have reviewed and interpreted all pertinent imaging results/findings within the past 24 hours.    Assessment/Plan:     Active Diagnoses:    Diagnosis Date Noted POA    Depression [F32.9] 10/16/2020 Yes      Problems Resolved During this Admission:     VTE Risk Mitigation (From admission, onward)    None        Acute depression  Anxiety  htn  Glaucoma  Hyperlipidemia    Plan :  Continue with psych eval and reccs  Resume home meds  No further blood work or imaging nececsary at this time  Code status full      Ariel Lopez MD  Department of Hospital Medicine   [facility]

## 2020-10-18 LAB — BACTERIA UR CULT: ABNORMAL

## 2020-10-18 PROCEDURE — 11400000 HC PSYCH PRIVATE ROOM

## 2020-10-18 PROCEDURE — 25000003 PHARM REV CODE 250: Performed by: EMERGENCY MEDICINE

## 2020-10-18 PROCEDURE — 25000003 PHARM REV CODE 250: Performed by: PSYCHIATRY & NEUROLOGY

## 2020-10-18 PROCEDURE — 99232 PR SUBSEQUENT HOSPITAL CARE,LEVL II: ICD-10-PCS | Mod: ,,, | Performed by: PSYCHIATRY & NEUROLOGY

## 2020-10-18 PROCEDURE — 99232 SBSQ HOSP IP/OBS MODERATE 35: CPT | Mod: ,,, | Performed by: PSYCHIATRY & NEUROLOGY

## 2020-10-18 RX ORDER — TRAZODONE HYDROCHLORIDE 50 MG/1
50 TABLET ORAL NIGHTLY
Status: DISCONTINUED | OUTPATIENT
Start: 2020-10-18 | End: 2020-10-20 | Stop reason: HOSPADM

## 2020-10-18 RX ORDER — TRAZODONE HYDROCHLORIDE 50 MG/1
50 TABLET ORAL NIGHTLY PRN
Status: DISCONTINUED | OUTPATIENT
Start: 2020-10-18 | End: 2020-10-20 | Stop reason: HOSPADM

## 2020-10-18 RX ADMIN — TRAZODONE HYDROCHLORIDE 50 MG: 50 TABLET ORAL at 08:10

## 2020-10-18 RX ADMIN — METOPROLOL SUCCINATE 50 MG: 25 TABLET, EXTENDED RELEASE ORAL at 08:10

## 2020-10-18 RX ADMIN — THERA TABS 1 TABLET: TAB at 08:10

## 2020-10-18 RX ADMIN — VENLAFAXINE HYDROCHLORIDE 225 MG: 75 CAPSULE, EXTENDED RELEASE ORAL at 08:10

## 2020-10-18 RX ADMIN — FOLIC ACID 1 MG: 1 TABLET ORAL at 08:10

## 2020-10-18 NOTE — PLAN OF CARE
POC reviewed this shift and is ongoing. Patient withdrawn, depressed, and anxious. . Denies Suicidal Ideation, Homicidal Ideation, Auditory Hallucinations, Visual Hallucinations, Tactile Hallucinations, and Gustatory Hallucinations. Reports less depression but becomes despondant when referencing her  of whom is home alone.     Kal Headley RN

## 2020-10-18 NOTE — PLAN OF CARE
POC reviewed this shift. Pt cooperative, came out on unit for snacks and meds. Meds taken with no adverse reaction. Amb on unit independently. Pt denies SI/HI, A/V hallucination. Pt depressed with no negative behaviors. Will continue to monitor for changes. SOPHY UMANA

## 2020-10-18 NOTE — PROGRESS NOTES
PSYCHIATRY INPATIENT PROGRESS NOTE  SUBSEQUENT HOSPITAL VISIT      10/18/2020 10:14 AM   Kaya Martinez   1945   [id number]           DATE OF ADMISSION: 10/16/2020  1:02 PM    SITE: [facility]    SUBJECTIVE (w/ psychiatric ROS):    Initial HPI from 10/17/2020:  Kaya Martinez is a 74 y.o. female with a past medical history as noted below and a past psychiatric history of MDD.  The patient was seen and examined.  The chart was reviewed.  On examination today, the patient endorses that she has a long history of MDD beginning in her 30s that she was doing fairly well on a regimen of Effexor  mg PO daily (prescribed by her psychiatrist, Dr. Hyatt, in [place], [place]) up until about a month ago.  She states that about one month ago, her  had a stroke, and he has been requiring significant care with ADLs since then.  She states that this increased stress has resulted in a worsening in her depression with an inability to sleep over the past three days.  She also endorses a reduction in attention to ADLs as a result of poor sleep, increased depression, and uncontrollable crying.  She denies any current/recent passive/active SI/HI.  She endorses compliance with her outpatient regimen of Effexor XR and denies any adverse effects to her current medication regimen.  Regarding current medical/physical complaints, she endorses a chronic issue of L eye blindness. She denies any other medical complaints at this time.  NAD was observed during the examination other than crying spells.  She endorses an OK appetite.  She denies any hx of silvia or psychosis.  See detailed psychiatric ROS below.  After discussing the risks, benefits, alt vs no treatment, she is agreeable and desiring a trial of Remeron 15 mg PO QHS in an effort to improve her sleep/mood in addition to continuing her Effexor  mg PO daily.     Per nursing on 10/18/2020:  POC reviewed this shift. Pt cooperative, came out on unit for snacks and  "meds. Meds taken with no adverse reaction. Amb on unit independently. Pt denies SI/HI, A/V hallucination. Pt depressed with no negative behaviors. Will continue to monitor for changes. SOPHY RN    Interval Hx from today (10/18/2020):  Patient seen and chart reviewed.  Patient continues to appear blunted and tearful in the context of depression.  She endorses only sleeping a couple hours overnight and endorses AEs to the Remeron such as "weird thoughts and dreams."  She is requesting to try something different for sleep.  She states that her mood is "a little down."  She endorss an OK appetite.  She denies any significant anxiety at this time.  She endorses medication compliance.  She denies any other AEs at this time.  She denies any acute medical/physical complaints.  She denies any active or passive SI/HI.  She denies any AH, VH, delusions, paranoia, or silvia.  Her son is arriving in town today to help care for her .  After discussing the risks, benefits, alt vs no treatment, the patient is agreeable and desiring  A trial of Trazodone for sleep/mood in addition to her Effexor XR.       ROS  General ROS: negative for - chills, fatigue, fever or night sweats  Ophthalmic ROS: negative for - blurry vision, double vision or eye pain  ENT ROS: negative for - sinus pain, headaches, sore throat; positive for chronic blindness in L eye   Allergy and Immunology ROS: negative for - hives, itchy/watery eyes or nasal congestion  Hematological and Lymphatic ROS: negative for - bleeding problems, bruising, jaundice or pallor  Endocrine ROS: negative for - galactorrhea, hot flashes, mood swings, palpitations or temperature intolerance  Respiratory ROS: negative for - cough, hemoptysis, shortness of breath, tachypnea or wheezing  Cardiovascular ROS: negative for - chest pain, dyspnea on exertion, loss of consciousness, palpitations, rapid heart rate or shortness of breath  Gastrointestinal ROS: negative for - appetite loss, " nausea, abdominal pain, blood in stools, change in bowel habits, constipation or diarrhea  Genito-Urinary ROS: negative for - incontinence, nocturia or pelvic pain  Musculoskeletal ROS: negative for - joint stiffness, joint swelling, joint pain or muscle pain   Neurological ROS: negative for - behavioral changes, confusion, dizziness, memory loss, numbness/tingling or seizures  Dermatological ROS: negative for dry skin, hair changes, pruritus or rash  Psychiatric ROS: see detailed psychiatric ROS above in history section       PAST MEDICAL & SURGICAL HISTORY   Past Medical History:   Diagnosis Date    Cataract     Right Eye    Depression     Glaucoma      Past Surgical History:   Procedure Laterality Date    CATARACT EXTRACTION  1985    Dr. Jovan Cadena   Left Eye    CORNEAL TRANSPLANT  1993     Dr. Jovan Cadena  Left Eye    PENETRATING KERATOPLASTY      STRABISMUS SURGERY  1999    Dr. Jovan Cadena Left Eye    VITRECTOMY         FAMILY HISTORY   Family History   Problem Relation Age of Onset    Cancer Mother 76        Colon    Cancer Father 62        prostate    Hypertension Father     Macular degeneration Father     Cancer Sister 9        brain tumor    Diabetes Paternal Aunt     Diabetes Maternal Grandmother        ALLERGIES   Review of patient's allergies indicates:   Allergen Reactions    Morphine Itching       CURRENT MEDICATION REGIMEN   Home Meds:   Prior to Admission medications    Medication Sig Start Date End Date Taking? Authorizing Provider   metoprolol succinate (TOPROL-XL) 50 MG 24 hr tablet  9/22/20   Historical Provider   timolol maleate 0.5% (TIMOPTIC-XR) 0.5 % SolG Place 1 drop into the left eye once daily.      Tone Velasco III, MD   venlafaxine (EFFEXOR-XR) 150 MG Cp24  7/17/20   Historical Provider       OTC Meds: denies    Scheduled Meds:    folic acid  1 mg Oral Daily    metoprolol succinate  50 mg Oral Daily    mirtazapine  15 mg Oral QHS    multivitamin  1 tablet  Oral Daily    timolol maleate 0.5%  1 drop Left Eye Daily    venlafaxine  225 mg Oral Daily      PRN Meds: acetaminophen, diphenhydrAMINE, influenza, LORazepam, pneumoc 13-cesar conj-dip cr(PF)   Psychotherapeutics (From admission, onward)    Start     Stop Route Frequency Ordered    10/18/20 0900  venlafaxine 24 hr capsule 225 mg      -- Oral Daily 10/17/20 1122    10/17/20 2100  mirtazapine tablet 15 mg      -- Oral Nightly 10/17/20 1122    10/16/20 1806  LORazepam tablet 0.5 mg      -- Oral Every 6 hours PRN 10/16/20 1706          LABORATORY DATA   Recent Results (from the past 72 hour(s))   COVID-19 Rapid Screening    Collection Time: 10/16/20  1:20 PM   Result Value Ref Range    SARS-CoV-2 RNA, Amplification, Qual Negative Negative   CBC auto differential    Collection Time: 10/16/20  1:22 PM   Result Value Ref Range    WBC 6.93 3.90 - 12.70 K/uL    RBC 4.59 4.00 - 5.40 M/uL    Hemoglobin 13.3 12.0 - 16.0 g/dL    Hematocrit 40.6 37.0 - 48.5 %    Mean Corpuscular Volume 89 82 - 98 fL    Mean Corpuscular Hemoglobin 29.0 27.0 - 31.0 pg    Mean Corpuscular Hemoglobin Conc 32.8 32.0 - 36.0 g/dL    RDW 12.0 11.5 - 14.5 %    Platelets 286 150 - 350 K/uL    MPV 8.6 (L) 9.2 - 12.9 fL    Immature Granulocytes 0.3 0.0 - 0.5 %    Gran # (ANC) 4.8 1.8 - 7.7 K/uL    Immature Grans (Abs) 0.02 0.00 - 0.04 K/uL    Lymph # 1.4 1.0 - 4.8 K/uL    Mono # 0.6 0.3 - 1.0 K/uL    Eos # 0.1 0.0 - 0.5 K/uL    Baso # 0.05 0.00 - 0.20 K/uL    nRBC 0 0 /100 WBC    Gran% 69.1 38.0 - 73.0 %    Lymph% 20.1 18.0 - 48.0 %    Mono% 8.4 4.0 - 15.0 %    Eosinophil% 1.4 0.0 - 8.0 %    Basophil% 0.7 0.0 - 1.9 %    Differential Method Automated    Acetaminophen level    Collection Time: 10/16/20  1:22 PM   Result Value Ref Range    Acetaminophen (Tylenol), Serum <10.0 10.0 - 20.0 ug/mL   Hemoglobin A1c    Collection Time: 10/16/20  1:22 PM   Result Value Ref Range    Hemoglobin A1C 5.3 4.0 - 5.6 %    Estimated Avg Glucose 105 68 - 131 mg/dL    Comprehensive metabolic panel    Collection Time: 10/16/20  1:23 PM   Result Value Ref Range    Sodium 139 136 - 145 mmol/L    Potassium 3.9 3.5 - 5.1 mmol/L    Chloride 106 95 - 110 mmol/L    CO2 25 23 - 29 mmol/L    Glucose 103 70 - 110 mg/dL    BUN, Bld 18 8 - 23 mg/dL    Creatinine 0.7 0.5 - 1.4 mg/dL    Calcium 8.9 8.7 - 10.5 mg/dL    Total Protein 7.5 6.0 - 8.4 g/dL    Albumin 4.1 3.5 - 5.2 g/dL    Total Bilirubin 1.1 (H) 0.1 - 1.0 mg/dL    Alkaline Phosphatase 62 55 - 135 U/L    AST 15 10 - 40 U/L    ALT 22 10 - 44 U/L    Anion Gap 8 8 - 16 mmol/L    eGFR if African American >60.0 >60 mL/min/1.73 m^2    eGFR if non African American >60.0 >60 mL/min/1.73 m^2   TSH    Collection Time: 10/16/20  1:23 PM   Result Value Ref Range    TSH 0.655 0.400 - 4.000 uIU/mL   Ethanol    Collection Time: 10/16/20  1:23 PM   Result Value Ref Range    Alcohol, Medical, Serum <3 <10 mg/dL   Urinalysis, Reflex to Urine Culture Urine, Clean Catch    Collection Time: 10/16/20  2:23 PM    Specimen: Urine   Result Value Ref Range    Specimen UA Urine, Clean Catch     Color, UA Yellow Yellow, Straw, [place]    Appearance, UA Cloudy (A) Clear    pH, UA 5.0 5.0 - 8.0    Specific Gravity, UA 1.020 1.005 - 1.030    Protein, UA Negative Negative    Glucose, UA Negative Negative    Ketones, UA 2+ (A) Negative    Bilirubin (UA) Negative Negative    Occult Blood UA Negative Negative    Nitrite, UA Positive (A) Negative    Urobilinogen, UA 1.0 <2.0 EU/dL    Leukocytes, UA 1+ (A) Negative   Drug screen panel, emergency    Collection Time: 10/16/20  2:23 PM   Result Value Ref Range    Benzodiazepines Negative     Methadone metabolites Negative     Cocaine (Metab.) Negative     Opiate Scrn, Ur Negative     Barbiturate Screen, Ur Negative     Amphetamine Screen, Ur Negative     THC Negative     Phencyclidine Negative     Creatinine, Random Ur 188.0 15.0 - 325.0 mg/dL    Toxicology Information SEE COMMENT    Urinalysis Microscopic    Collection  "Time: 10/16/20  2:23 PM   Result Value Ref Range    RBC, UA 6 (H) 0 - 4 /hpf    WBC, UA 15 (H) 0 - 5 /hpf    Bacteria Many (A) None-Occ /hpf    Squam Epithel, UA 10 /hpf    Hyaline Casts, UA 2 (A) 0-1/lpf /lpf    Microscopic Comment SEE COMMENT    Urine culture    Collection Time: 10/16/20  2:23 PM    Specimen: Urine   Result Value Ref Range    Urine Culture, Routine (A)      GRAM NEGATIVE PAN  >100,000 cfu/ml  Identification and susceptibility pending     Lipid panel    Collection Time: 10/17/20  5:45 AM   Result Value Ref Range    Cholesterol 195 120 - 199 mg/dL    Triglycerides 85 30 - 150 mg/dL    HDL 66 40 - 75 mg/dL    LDL Cholesterol 112.0 63.0 - 159.0 mg/dL    Hdl/Cholesterol Ratio 33.8 20.0 - 50.0 %    Total Cholesterol/HDL Ratio 3.0 2.0 - 5.0    Non-HDL Cholesterol 129 mg/dL      No results found for: PHENYTOIN, PHENOBARB, VALPROATE, CBMZ      EXAMINATION    VITALS   Vitals:    10/17/20 0753 10/17/20 1700 10/17/20 1917 10/18/20 0745   BP: 137/80  138/76 (!) 178/90   BP Location:   Right arm Right arm   Patient Position: Standing  Sitting Sitting   Pulse: 98  64 85   Resp: 20  20 20   Temp: 97.9 °F (36.6 °C)  97.9 °F (36.6 °C) 97.8 °F (36.6 °C)   TempSrc: Oral  Oral Oral   SpO2: (!) 94%  99% 98%   Weight:  86.2 kg (190 lb)     Height:          CONSTITUTIONAL  General Appearance: NAD, unremarkable, age appropriate, overweight, seated in chair     MUSCULOSKELETAL  Muscle Strength and Tone: WNL   Abnormal Involuntary Movements: none observed   Gait and Station: WNL; non-ataxic      PSYCHIATRIC   Behavior/Cooperation:  cooperative, eye contact minimal, tearful   Speech:  normal rate, normal pitch, normal volume, monotone  Language: grossly intact, able to name, able to repeat with spontaneous speech  Mood: "a little down"  Affect:  blunted; tearful   Associations: intact; no ARVIN  Thought Process: Linear  Thought Content: no suicidality, no homicidality, no delusions, no paranoia, no AH/VH (no RIS " observed)  Sensorium:  Awake  Alert and Oriented: to person, place, situation, day of week, month of year, year  Memory: 3/3 immediate, 3/3 at 5 minutes               Recent:  Intact; able to report recent events              Remote: Intact; Named 4/4 past presidents   Attention/concentration: appropriate for age/education. Able to spell w-o-r-l-d & d-l-r-o-w   Similarities: Intact; (difference between apple and orange?)  Abstract reasoning: Intact  Insight: Intact  Judgment: Intact     CAM ICU Delirium Assessment - NEGATIVE        Is the patient aware of the biomedical complications associated with substance abuse and mental illness? yes        MEDICAL DECISION MAKING     ASSESSMENT      MDD, recurrent, severe, without psychosis   Insomnia  HTN  Glaucoma     RECOMMENDATIONS       - Patient currently meets PEC/CEC criteria due to being gravely disabled 2/2 mental illness at this time.      - Continue current treatment plan, including Effexor XR at home regimen of 225 mg PO daily for mood/anxiety (discussed risks/benefits/alt vs no treatment with patient)    - Discontinue Remeron as noted in subjective above, and begin Trazodone 50 mg PO QHS scheduled with an additional 50 mg PO QHS PRN for sleep/mood  (discussed risks/benefits/alt vs no treatment with patient)     - Continue Timolol for Glaucoma and Toprol XL for HTN per Hospital Medicine Team      Need for continued hospitalization:  continue inpatient psychiatric hospitalization for further stabilization     Target Disposition:  Hopefully home in 2-3 days         Time spent with patient and/or on unit managing/coordinating patient's care: 25 minutes      More than 50% of the time was spent counseling/coordinating care        STAFF:  Stevenson Alvarez MD  [facility]  10/18/2020

## 2020-10-19 PROCEDURE — 25000003 PHARM REV CODE 250: Performed by: EMERGENCY MEDICINE

## 2020-10-19 PROCEDURE — 25000003 PHARM REV CODE 250: Performed by: PSYCHIATRY & NEUROLOGY

## 2020-10-19 PROCEDURE — 11400000 HC PSYCH PRIVATE ROOM

## 2020-10-19 PROCEDURE — 99232 SBSQ HOSP IP/OBS MODERATE 35: CPT | Mod: ,,, | Performed by: PSYCHIATRY & NEUROLOGY

## 2020-10-19 PROCEDURE — 99232 PR SUBSEQUENT HOSPITAL CARE,LEVL II: ICD-10-PCS | Mod: ,,, | Performed by: PSYCHIATRY & NEUROLOGY

## 2020-10-19 RX ADMIN — METOPROLOL SUCCINATE 50 MG: 25 TABLET, EXTENDED RELEASE ORAL at 08:10

## 2020-10-19 RX ADMIN — FOLIC ACID 1 MG: 1 TABLET ORAL at 08:10

## 2020-10-19 RX ADMIN — THERA TABS 1 TABLET: TAB at 08:10

## 2020-10-19 RX ADMIN — TIMOLOL MALEATE 1 DROP: 5 SOLUTION/ DROPS OPHTHALMIC at 10:10

## 2020-10-19 RX ADMIN — TRAZODONE HYDROCHLORIDE 50 MG: 50 TABLET ORAL at 08:10

## 2020-10-19 RX ADMIN — VENLAFAXINE HYDROCHLORIDE 225 MG: 75 CAPSULE, EXTENDED RELEASE ORAL at 08:10

## 2020-10-19 NOTE — PROGRESS NOTES
PSYCHIATRY INPATIENT PROGRESS NOTE  SUBSEQUENT HOSPITAL VISIT      10/19/2020 10:14 AM   Kaya Martinez   1945   [id number]           DATE OF ADMISSION: 10/16/2020  1:02 PM    SITE: [facility] SUBJECTIVE (w/ psychiatric ROS):    Initial HPI from 10/17/2020:  Kaya Martinez is a 74 y.o. female with a past medical history as noted below and a past psychiatric history of MDD.  The patient was seen and examined.  The chart was reviewed.  On examination today, the patient endorses that she has a long history of MDD beginning in her 30s that she was doing fairly well on a regimen of Effexor  mg PO daily (prescribed by her psychiatrist, Dr. Hyatt, in [place], [place]) up until about a month ago.  She states that about one month ago, her  had a stroke, and he has been requiring significant care with ADLs since then.  She states that this increased stress has resulted in a worsening in her depression with an inability to sleep over the past three days.  She also endorses a reduction in attention to ADLs as a result of poor sleep, increased depression, and uncontrollable crying.  She denies any current/recent passive/active SI/HI.  She endorses compliance with her outpatient regimen of Effexor XR and denies any adverse effects to her current medication regimen.  Regarding current medical/physical complaints, she endorses a chronic issue of L eye blindness. She denies any other medical complaints at this time.  NAD was observed during the examination other than crying spells.  She endorses an OK appetite.  She denies any hx of silvia or psychosis.  See detailed psychiatric ROS below.  After discussing the risks, benefits, alt vs no treatment, she is agreeable and desiring a trial of Remeron 15 mg PO QHS in an effort to improve her sleep/mood in addition to continuing her Effexor  mg PO daily.     Per nursing on 10/19/2020:  Review poc this shift. Pt denies depression. Amb on the unit independently.  "No negative behavior. Ate 100% snack, took meds with no adverse reaction.  Denies SI/HI, A/V hallucination. Will continue to monitor for  safety. SOPHY RN     Interval Hx from today (10/19/2020):  Patient seen, discussed with staff, and chart reviewed.  Patient continues to appear blunted but brightened somewhat throughout the interview.  She endorses improved sleep with the d/c of remeron and addition of trazadone. Appetite is "good."  She states that her mood is "pretty good."   She denies any significant anxiety at this time.  She endorses medication compliance.  She denies any other AEs at this time.  She denies any acute medical/physical complaints.  She denies any active or passive SI/HI.  She denies any AH, VH, delusions, paranoia, or silvia.  Her son is in town  to help care for her  and she states a friend will be checking in on her after d/c.        ROS  General ROS: negative for - chills, fatigue, fever or night sweats  Ophthalmic ROS: negative for - blurry vision, double vision or eye pain  ENT ROS: negative for - sinus pain, headaches, sore throat; positive for chronic blindness in L eye   Allergy and Immunology ROS: negative for - hives, itchy/watery eyes or nasal congestion  Hematological and Lymphatic ROS: negative for - bleeding problems, bruising, jaundice or pallor  Endocrine ROS: negative for - galactorrhea, hot flashes, mood swings, palpitations or temperature intolerance  Respiratory ROS: negative for - cough, hemoptysis, shortness of breath, tachypnea or wheezing  Cardiovascular ROS: negative for - chest pain, dyspnea on exertion, loss of consciousness, palpitations, rapid heart rate or shortness of breath  Gastrointestinal ROS: negative for - appetite loss, nausea, abdominal pain, blood in stools, change in bowel habits, constipation or diarrhea  Genito-Urinary ROS: negative for - incontinence, nocturia or pelvic pain  Musculoskeletal ROS: negative for - joint stiffness, joint swelling, " joint pain or muscle pain   Neurological ROS: negative for - behavioral changes, confusion, dizziness, memory loss, numbness/tingling or seizures  Dermatological ROS: negative for dry skin, hair changes, pruritus or rash  Psychiatric ROS: see detailed psychiatric ROS above in history section       PAST MEDICAL & SURGICAL HISTORY   Past Medical History:   Diagnosis Date    Cataract     Right Eye    Depression     Glaucoma      Past Surgical History:   Procedure Laterality Date    CATARACT EXTRACTION  1985    Dr. Jovan Cadena   Left Eye    CORNEAL TRANSPLANT  1993     Dr. Jovan Cadena  Left Eye    PENETRATING KERATOPLASTY      STRABISMUS SURGERY  1999    Dr. Jovan Cadena Left Eye    VITRECTOMY         FAMILY HISTORY   Family History   Problem Relation Age of Onset    Cancer Mother 76        Colon    Cancer Father 62        prostate    Hypertension Father     Macular degeneration Father     Cancer Sister 9        brain tumor    Diabetes Paternal Aunt     Diabetes Maternal Grandmother        ALLERGIES   Review of patient's allergies indicates:   Allergen Reactions    Morphine Itching       CURRENT MEDICATION REGIMEN   Home Meds:   Prior to Admission medications    Medication Sig Start Date End Date Taking? Authorizing Provider   metoprolol succinate (TOPROL-XL) 50 MG 24 hr tablet  9/22/20   Historical Provider   timolol maleate 0.5% (TIMOPTIC-XR) 0.5 % SolG Place 1 drop into the left eye once daily.      Tone Velasco III, MD   venlafaxine (EFFEXOR-XR) 150 MG Cp24  7/17/20   Historical Provider       OTC Meds: denies    Scheduled Meds:    folic acid  1 mg Oral Daily    metoprolol succinate  50 mg Oral Daily    multivitamin  1 tablet Oral Daily    timolol maleate 0.5%  1 drop Left Eye Daily    traZODone  50 mg Oral QHS    venlafaxine  225 mg Oral Daily      PRN Meds: acetaminophen, diphenhydrAMINE, influenza, LORazepam, pneumoc 13-cesar conj-dip cr(PF), traZODone   Psychotherapeutics (From  "admission, onward)    Start     Stop Route Frequency Ordered    10/18/20 2100  traZODone tablet 50 mg      -- Oral Nightly 10/18/20 1348    10/18/20 1448  traZODone tablet 50 mg      -- Oral Nightly PRN 10/18/20 1349    10/18/20 0900  venlafaxine 24 hr capsule 225 mg      -- Oral Daily 10/17/20 1122    10/16/20 1806  LORazepam tablet 0.5 mg      -- Oral Every 6 hours PRN 10/16/20 1706          LABORATORY DATA   Recent Results (from the past 72 hour(s))   Lipid panel    Collection Time: 10/17/20  5:45 AM   Result Value Ref Range    Cholesterol 195 120 - 199 mg/dL    Triglycerides 85 30 - 150 mg/dL    HDL 66 40 - 75 mg/dL    LDL Cholesterol 112.0 63.0 - 159.0 mg/dL    Hdl/Cholesterol Ratio 33.8 20.0 - 50.0 %    Total Cholesterol/HDL Ratio 3.0 2.0 - 5.0    Non-HDL Cholesterol 129 mg/dL      No results found for: PHENYTOIN, PHENOBARB, VALPROATE, CBMZ      EXAMINATION    VITALS   Vitals:    10/17/20 1917 10/18/20 0745 10/18/20 1907 10/19/20 0727   BP: 138/76 (!) 178/90 (!) 140/89 136/73   BP Location: Right arm Right arm Right arm Right arm   Patient Position: Sitting Sitting Sitting Lying   Pulse: 64 85 83 75   Resp: 20 20 20 20   Temp: 97.9 °F (36.6 °C) 97.8 °F (36.6 °C) 97.9 °F (36.6 °C) 98 °F (36.7 °C)   TempSrc: Oral Oral Oral Oral   SpO2: 99% 98% 100% 96%   Weight:       Height:          CONSTITUTIONAL  General Appearance: NAD, unremarkable, age appropriate, overweight, seated in chair     MUSCULOSKELETAL  Muscle Strength and Tone: WNL   Abnormal Involuntary Movements: none observed   Gait and Station: WNL; non-ataxic      PSYCHIATRIC   Behavior/Cooperation:  cooperative, eye contact minimal   Speech:  normal rate, normal pitch, normal volume, monotone  Language: grossly intact, able to name, able to repeat with spontaneous speech  Mood: "pretty good"  Affect:  blunted  Associations: intact; no ARVIN  Thought Process: Linear  Thought Content: no suicidality, no homicidality, no delusions, no paranoia, no AH/VH (no " RIS observed)  Sensorium:  Awake  Alert and Oriented: to person, place, situation, day of week, month of year, year  Memory: 3/3 immediate, 3/3 at 5 minutes               Recent:  Intact; able to report recent events              Remote: Intact; Named 4/4 past presidents   Attention/concentration: appropriate for age/education. Able to spell w-o-r-l-d & d-l-r-o-w   Similarities: Intact  Abstract reasoning: Intact  Insight: Intact  Judgment: Intact           Is the patient aware of the biomedical complications associated with substance abuse and mental illness? yes        MEDICAL DECISION MAKING     ASSESSMENT      MDD, recurrent, severe, without psychosis   Insomnia  HTN  Glaucoma     RECOMMENDATIONS       - Patient currently meets PEC/CEC criteria due to being gravely disabled 2/2 mental illness at this time.      - Continue current treatment plan, including Effexor XR at home regimen of 225 mg PO daily for mood/anxiety (discussed risks/benefits/alt vs no treatment with patient)    - Continue Trazodone 50 mg PO QHS scheduled with an additional 50 mg PO QHS PRN for sleep/mood  (discussed risks/benefits/alt vs no treatment with patient)     - Continue Timolol for Glaucoma and Toprol XL for HTN per Hospital Medicine Team      Need for continued hospitalization:  continue inpatient psychiatric hospitalization for further stabilization     Target Disposition:  Hopefully home in 1-2 days         Time spent with patient and/or on unit managing/coordinating patient's care: 15 minutes  915-930      More than 50% of the time was spent counseling/coordinating care        STAFF:  Chandrakant Rodriguez md

## 2020-10-19 NOTE — PSYCH
[facility]  Psychosocial Assessment    Date: 10/19/2020  Time: 10:36 AM    Name: Kaya Martinez    Age: 74 y.o.       : 1945         Race:   Precipitating Event: hopelessness/helplessness    Symptoms: cry often/depressed    Marital Status:     Spouse/Significant Other: Jamaal Martinez    Quality of Relationship:Good (50th anniverary)    Education: high school diploma/GED    Occupation:    Employer/School:    Medical/Psychiatric History   Past Psychiatric History:   Therapy [facility] in [place]  Currently in treatment with Ms Hayes    Medical Conditions/including prior head injury: See past medical history    Suicidal Ideation/Homicidal Ideation:  PEC/CEC    Current Medications:   Current Facility-Administered Medications:     acetaminophen tablet 650 mg, 650 mg, Oral, Q6H PRN, Jason Rodriguez MD    diphenhydrAMINE capsule 25 mg, 25 mg, Oral, Nightly PRN, Jason Rodriguez MD    folic acid tablet 1 mg, 1 mg, Oral, Daily, Jason Rodriguez MD, 1 mg at 10/19/20 0815    influenza (QUADRIVALENT ADJUVANTED PF) vaccine 0.5 mL, 0.5 mL, Intramuscular, vaccine x 1 dose, Jason Rodriguez MD    LORazepam tablet 0.5 mg, 0.5 mg, Oral, Q6H PRN, Jason Rdoriguez MD    metoprolol succinate (TOPROL-XL) 24 hr tablet 50 mg, 50 mg, Oral, Daily, Delvin Ruiz MD, 50 mg at 10/19/20 0815    multivitamin tablet, 1 tablet, Oral, Daily, Jason Rodriguez MD, 1 tablet at 10/19/20 0815    pneumoc 13-cesar conj-dip cr(PF) (PREVNAR 13 (PF)) 0.5 mL, 0.5 mL, Intramuscular, vaccine x 1 dose, Jason Rodriguez MD    timolol maleate 0.5% ophthalmic solution 1 drop, 1 drop, Left Eye, Daily, Delvin Ruiz MD, 1 drop at 10/19/20 1003    traZODone tablet 50 mg, 50 mg, Oral, QHS, Stevenson Alvarez MD, 50 mg at 10/18/20 2003    traZODone tablet 50 mg, 50 mg, Oral, Nightly PRN, Stevenson Alvarez MD    venlafaxine 24 hr capsule 225 mg, 225 mg, Oral, Daily, Stevenson Alvarez MD, 225 mg at  10/19/20 0815    Allergies:   Review of patient's allergies indicates:   Allergen Reactions    Morphine Itching       Family History  Mother:  Present Age:  Occupation:  Medical/Psychiatric History:NO    Father:  Present Age:  Occupation:  Medical/Psychiatric History: No    Siblings and present ages: 3 brothers, 2 in [place], 1 in [facility], 70 Soy 63, Nikolas 72 sister is  of brain tumor  Medical or Psychiatric Problems:    Relationships with parents and siblings:    Developmental History  Place of birth: [place]    Developmental Milestones: Patient reports all met    History of Physical/Sexual Abuse:No    Childhood Behavioral Problems: No    Children  Names/Ages: Arun, 45, Nicky 42, christopher, 36  Medical or Psychiatric Problems:    Quality of Parent/Child Relationship: Very good.    Criminal History  Arrests:  No    Miscellaneous:  Financial Issues: no    Leisure Activities:    Owns a gun? yes Secured? Yes     History:  no    Comments:    Discharge Plans:  Will follow-up with outpatient therapist for psychotherapy, outpatient psychiatrist for medication management and after care with personal therapist.

## 2020-10-19 NOTE — PLAN OF CARE
PATIENT IS ALERT AND COOPERATIVE.  DEPRESSION HAS IMPROVED ON CURRENT MEDICATION  AND TREATMENT REGIMEN.  NO CRYING NOTED.  PATIENT IS ATTENDING AND PARTICIPATING IN GROUPS OFFERED.  INTERACTS WITH STAFF AND SELECTED PEERS.  PATIENT IS SMILING AND LAUGHING UPON CONVERSATION WITH STAFF AT THE NURSES STATION AT THIS TIME.  DENIES S/I AND H/I. DENIES A/V HALLUCINATIONS.  CLOSE OBSERVATION MONITORING CONTINUED.

## 2020-10-19 NOTE — PLAN OF CARE
Review poc this shift. Pt denies depression. Amb on the unit independently. No negative behavior. Ate 100% snack, took meds with no adverse reaction.  Denies SI/HI, A/V hallucination. Will continue to monitor for  safety. SOPHY UMANA

## 2020-10-20 VITALS
BODY MASS INDEX: 32.44 KG/M2 | SYSTOLIC BLOOD PRESSURE: 160 MMHG | RESPIRATION RATE: 20 BRPM | WEIGHT: 190 LBS | DIASTOLIC BLOOD PRESSURE: 83 MMHG | TEMPERATURE: 98 F | HEART RATE: 74 BPM | HEIGHT: 64 IN | OXYGEN SATURATION: 100 %

## 2020-10-20 PROBLEM — F32.A DEPRESSION: Status: RESOLVED | Noted: 2020-10-16 | Resolved: 2020-10-20

## 2020-10-20 PROCEDURE — 90472 IMMUNIZATION ADMIN EACH ADD: CPT | Performed by: PSYCHIATRY & NEUROLOGY

## 2020-10-20 PROCEDURE — 90686 IIV4 VACC NO PRSV 0.5 ML IM: CPT | Performed by: PSYCHIATRY & NEUROLOGY

## 2020-10-20 PROCEDURE — 90670 PCV13 VACCINE IM: CPT | Performed by: PSYCHIATRY & NEUROLOGY

## 2020-10-20 PROCEDURE — 25000003 PHARM REV CODE 250: Performed by: EMERGENCY MEDICINE

## 2020-10-20 PROCEDURE — G0009 ADMIN PNEUMOCOCCAL VACCINE: HCPCS | Performed by: PSYCHIATRY & NEUROLOGY

## 2020-10-20 PROCEDURE — 25000003 PHARM REV CODE 250: Performed by: PSYCHIATRY & NEUROLOGY

## 2020-10-20 PROCEDURE — 63600175 PHARM REV CODE 636 W HCPCS: Performed by: PSYCHIATRY & NEUROLOGY

## 2020-10-20 PROCEDURE — G0008 ADMIN INFLUENZA VIRUS VAC: HCPCS | Performed by: PSYCHIATRY & NEUROLOGY

## 2020-10-20 PROCEDURE — 90471 IMMUNIZATION ADMIN: CPT | Performed by: PSYCHIATRY & NEUROLOGY

## 2020-10-20 RX ORDER — TRAZODONE HYDROCHLORIDE 50 MG/1
50 TABLET ORAL NIGHTLY
Qty: 30 TABLET | Refills: 0 | Status: SHIPPED | OUTPATIENT
Start: 2020-10-20 | End: 2021-03-29

## 2020-10-20 RX ORDER — VENLAFAXINE HYDROCHLORIDE 75 MG/1
225 CAPSULE, EXTENDED RELEASE ORAL DAILY
Qty: 90 CAPSULE | Refills: 0 | Status: SHIPPED | OUTPATIENT
Start: 2020-10-21 | End: 2021-03-31 | Stop reason: ALTCHOICE

## 2020-10-20 RX ADMIN — THERA TABS 1 TABLET: TAB at 08:10

## 2020-10-20 RX ADMIN — FOLIC ACID 1 MG: 1 TABLET ORAL at 08:10

## 2020-10-20 RX ADMIN — PNEUMOCOCCAL 13-VALENT CONJUGATE VACCINE 0.5 ML: 2.2; 2.2; 2.2; 2.2; 2.2; 4.4; 2.2; 2.2; 2.2; 2.2; 2.2; 2.2; 2.2 INJECTION, SUSPENSION INTRAMUSCULAR at 03:10

## 2020-10-20 RX ADMIN — METOPROLOL SUCCINATE 50 MG: 25 TABLET, EXTENDED RELEASE ORAL at 08:10

## 2020-10-20 RX ADMIN — VENLAFAXINE HYDROCHLORIDE 225 MG: 75 CAPSULE, EXTENDED RELEASE ORAL at 08:10

## 2020-10-20 RX ADMIN — INFLUENZA VIRUS VACCINE 0.5 ML: 15; 15; 15; 15 SUSPENSION INTRAMUSCULAR at 03:10

## 2020-10-20 RX ADMIN — TIMOLOL MALEATE 1 DROP: 5 SOLUTION/ DROPS OPHTHALMIC at 08:10

## 2020-10-20 NOTE — NURSING
PATIENT LEFT AMBULATORY SMILING AND EXPRESSING THANKS FOR CARE GIVEN. NO DISTRESS OBSERVED ACCOMPANIED BY  U ATTENDANT.

## 2020-10-20 NOTE — DISCHARGE SUMMARY
Discharge Summary  PSYCHIATRY        Admit Date: 10/16/2020    Discharge Date and Time:  10/20/2020 3:44 PM    Discharge Attending Physician: Jason Rodriguez MD     History of Present Illness:  Patient is a 74-year-old white female she with a history of depression which had recently been exacerbated.  Patient has been experiencing episodes of uncontrollable crying and obsessive thoughts but denies any active suicidality but does report initial also functionality.      Diagnoses:  Axis I: Patient Active Problem List   Diagnosis    Ptosis    Corneal graft rejection - Left Eye    Exotropia of both eyes - Left Eye      Axis II: NO DIAGNOSIS ON AXIS II (Z03.89)   Axis III:    Axis IV: other psychosocial or environmental problems   Axis V: 61-70 Some mild symptoms (e.g. depressed mood and mild insomnia) OR some difficulty in social, occupational, or school functioning (e.g. occasional truancy, or theft within the household), but generally functioning pretty well, has some meaningful interpersonal relationships       Discharged Condition: Stable/Improved    Hospital Course: Pt was admitted and started on medications.    Disposition: discharged to home    Activity: As tolerated    [facility]    Patient Instructions:   Current Discharge Medication List      START taking these medications    Details   traZODone (DESYREL) 50 MG tablet Take 1 tablet (50 mg total) by mouth every evening.  Qty: 30 tablet, Refills: 0         CONTINUE these medications which have CHANGED    Details   venlafaxine (EFFEXOR-XR) 75 MG 24 hr capsule Take 3 capsules (225 mg total) by mouth once daily.  Qty: 90 capsule, Refills: 0         CONTINUE these medications which have NOT CHANGED    Details   metoprolol succinate (TOPROL-XL) 50 MG 24 hr tablet     Comments: .      timolol maleate 0.5% (TIMOPTIC-XR) 0.5 % SolG Place 1 drop into the left eye once daily.               No discharge procedures on file.    Total time spent discharging patient:  30+minutes

## 2020-10-20 NOTE — PLAN OF CARE
Discharge orders noted and given to pt including follow up and med instruction. Verbalized understanding. Copy of dc form given to pt.  Pt denies si, hi or a v hallucinations on dc.  All personal belongings returned to pt on dc. Mood and affect brighter on dc.  Pt will follow up with [facility] on 10/23/20 at 9 am.  Pt's lady friend will transport pt home.  No c/o voiced at present.

## 2020-10-20 NOTE — NURSING
Pt. Was awake, alert and oriented x 4. Pt. Denied any c/o auditory or visual hallucinations. She also denied any c/o suicidal or homicidal ideations. Pt. Took meds without diff.

## 2020-12-17 ENCOUNTER — LAB VISIT (OUTPATIENT)
Dept: LAB | Facility: HOSPITAL | Age: 75
End: 2020-12-17
Payer: MEDICARE

## 2020-12-17 DIAGNOSIS — R53.83 FATIGUE: ICD-10-CM

## 2020-12-17 DIAGNOSIS — E66.8 OBESITY OF ENDOCRINE ORIGIN: Primary | ICD-10-CM

## 2020-12-17 LAB
BASOPHILS # BLD AUTO: 0.05 K/UL (ref 0–0.2)
BASOPHILS NFR BLD: 0.7 % (ref 0–1.9)
DIFFERENTIAL METHOD: ABNORMAL
EOSINOPHIL # BLD AUTO: 0.1 K/UL (ref 0–0.5)
EOSINOPHIL NFR BLD: 2 % (ref 0–8)
ERYTHROCYTE [DISTWIDTH] IN BLOOD BY AUTOMATED COUNT: 12.3 % (ref 11.5–14.5)
HCT VFR BLD AUTO: 38 % (ref 37–48.5)
HGB BLD-MCNC: 12.4 G/DL (ref 12–16)
IMM GRANULOCYTES # BLD AUTO: 0.01 K/UL (ref 0–0.04)
IMM GRANULOCYTES NFR BLD AUTO: 0.1 % (ref 0–0.5)
LYMPHOCYTES # BLD AUTO: 1.4 K/UL (ref 1–4.8)
LYMPHOCYTES NFR BLD: 20.1 % (ref 18–48)
MCH RBC QN AUTO: 29.3 PG (ref 27–31)
MCHC RBC AUTO-ENTMCNC: 32.6 G/DL (ref 32–36)
MCV RBC AUTO: 90 FL (ref 82–98)
MONOCYTES # BLD AUTO: 0.7 K/UL (ref 0.3–1)
MONOCYTES NFR BLD: 10.7 % (ref 4–15)
NEUTROPHILS # BLD AUTO: 4.5 K/UL (ref 1.8–7.7)
NEUTROPHILS NFR BLD: 66.4 % (ref 38–73)
NRBC BLD-RTO: 0 /100 WBC
PLATELET # BLD AUTO: 301 K/UL (ref 150–350)
PMV BLD AUTO: 8.8 FL (ref 9.2–12.9)
RBC # BLD AUTO: 4.23 M/UL (ref 4–5.4)
T4 SERPL-MCNC: 8.8 UG/DL (ref 4.5–11.5)
TSH SERPL DL<=0.005 MIU/L-ACNC: 0.48 UIU/ML (ref 0.4–4)
WBC # BLD AUTO: 6.85 K/UL (ref 3.9–12.7)

## 2020-12-17 PROCEDURE — 84436 ASSAY OF TOTAL THYROXINE: CPT

## 2020-12-17 PROCEDURE — 36415 COLL VENOUS BLD VENIPUNCTURE: CPT

## 2020-12-17 PROCEDURE — 83001 ASSAY OF GONADOTROPIN (FSH): CPT

## 2020-12-17 PROCEDURE — 82670 ASSAY OF TOTAL ESTRADIOL: CPT

## 2020-12-17 PROCEDURE — 84403 ASSAY OF TOTAL TESTOSTERONE: CPT

## 2020-12-17 PROCEDURE — 84481 FREE ASSAY (FT-3): CPT

## 2020-12-17 PROCEDURE — 85025 COMPLETE CBC W/AUTO DIFF WBC: CPT

## 2020-12-17 PROCEDURE — 84443 ASSAY THYROID STIM HORMONE: CPT

## 2020-12-18 LAB
ESTRADIOL SERPL-MCNC: 91 PG/ML
FSH SERPL-ACNC: 1.6 MIU/ML
T3FREE SERPL-MCNC: 2.8 PG/ML (ref 2.3–4.2)
TESTOST SERPL-MCNC: 198 NG/DL (ref 5–73)
THYROPEROXIDASE IGG SERPL-ACNC: <6 IU/ML

## 2021-01-10 ENCOUNTER — IMMUNIZATION (OUTPATIENT)
Dept: OBSTETRICS AND GYNECOLOGY | Facility: CLINIC | Age: 76
End: 2021-01-10
Payer: MEDICARE

## 2021-01-10 DIAGNOSIS — Z23 NEED FOR VACCINATION: ICD-10-CM

## 2021-01-10 PROCEDURE — 91300 COVID-19, MRNA, LNP-S, PF, 30 MCG/0.3 ML DOSE VACCINE: CPT | Mod: PBBFAC

## 2021-01-31 ENCOUNTER — IMMUNIZATION (OUTPATIENT)
Dept: OBSTETRICS AND GYNECOLOGY | Facility: CLINIC | Age: 76
End: 2021-01-31
Payer: MEDICARE

## 2021-01-31 DIAGNOSIS — Z23 NEED FOR VACCINATION: Primary | ICD-10-CM

## 2021-01-31 PROCEDURE — 0002A COVID-19, MRNA, LNP-S, PF, 30 MCG/0.3 ML DOSE VACCINE: CPT | Mod: PBBFAC

## 2021-01-31 PROCEDURE — 91300 COVID-19, MRNA, LNP-S, PF, 30 MCG/0.3 ML DOSE VACCINE: CPT | Mod: PBBFAC

## 2021-03-29 PROBLEM — E78.5 HYPERLIPIDEMIA: Status: ACTIVE | Noted: 2021-03-29

## 2021-03-29 PROBLEM — R55 SYNCOPE: Status: ACTIVE | Noted: 2021-03-29

## 2021-03-29 PROBLEM — G47.00 INSOMNIA: Status: ACTIVE | Noted: 2021-03-29

## 2021-03-29 PROBLEM — E63.0 ESSENTIAL FATTY ACID (EFA) DEFICIENCY: Status: ACTIVE | Noted: 2021-03-29

## 2021-03-29 PROBLEM — D64.9 ANEMIA: Status: ACTIVE | Noted: 2021-03-29

## 2021-03-29 PROBLEM — H81.10 BPPV (BENIGN PAROXYSMAL POSITIONAL VERTIGO): Status: ACTIVE | Noted: 2021-03-29

## 2021-03-29 PROBLEM — G25.81 RLS (RESTLESS LEGS SYNDROME): Status: ACTIVE | Noted: 2021-03-29

## 2021-03-29 PROBLEM — R00.2 PALPITATIONS: Status: ACTIVE | Noted: 2021-03-29

## 2021-03-29 PROBLEM — K21.9 GERD (GASTROESOPHAGEAL REFLUX DISEASE): Status: ACTIVE | Noted: 2021-03-29

## 2021-03-29 PROBLEM — E34.9 HORMONE IMBALANCE: Status: ACTIVE | Noted: 2021-03-29

## 2021-03-29 PROBLEM — R06.09 DYSPNEA ON EXERTION: Status: ACTIVE | Noted: 2021-03-29

## 2021-03-29 PROBLEM — E03.9 HYPOTHYROIDISM: Status: ACTIVE | Noted: 2021-03-29

## 2021-03-29 PROBLEM — I10 HYPERTENSION: Status: ACTIVE | Noted: 2021-03-29

## 2021-03-29 PROBLEM — G47.33 OSA (OBSTRUCTIVE SLEEP APNEA): Status: ACTIVE | Noted: 2021-03-29

## 2021-03-31 ENCOUNTER — HOSPITAL ENCOUNTER (INPATIENT)
Facility: HOSPITAL | Age: 76
LOS: 12 days | Discharge: HOME | DRG: 885 | End: 2021-04-12
Attending: EMERGENCY MEDICINE | Admitting: EMERGENCY MEDICINE
Payer: MEDICARE

## 2021-03-31 DIAGNOSIS — F31.12 BIPOLAR AFFECTIVE DISORDER, CURRENTLY MANIC, MODERATE: Primary | ICD-10-CM

## 2021-03-31 PROBLEM — R60.0 LOCALIZED EDEMA: Status: ACTIVE | Noted: 2021-03-31

## 2021-03-31 LAB
ALBUMIN SERPL BCP-MCNC: 2.9 G/DL (ref 3.5–5.2)
ALP SERPL-CCNC: 63 U/L (ref 55–135)
ALT SERPL W/O P-5'-P-CCNC: 56 U/L (ref 10–44)
AMPHET+METHAMPHET UR QL: NEGATIVE
ANION GAP SERPL CALC-SCNC: 8 MMOL/L (ref 8–16)
AST SERPL-CCNC: 26 U/L (ref 10–40)
BACTERIA #/AREA URNS HPF: ABNORMAL /HPF
BARBITURATES UR QL SCN>200 NG/ML: NEGATIVE
BASOPHILS # BLD AUTO: 0.06 K/UL (ref 0–0.2)
BASOPHILS NFR BLD: 0.6 % (ref 0–1.9)
BENZODIAZ UR QL SCN>200 NG/ML: NEGATIVE
BILIRUB SERPL-MCNC: 0.4 MG/DL (ref 0.1–1)
BILIRUB UR QL STRIP: NEGATIVE
BUN SERPL-MCNC: 16 MG/DL (ref 8–23)
BZE UR QL SCN: NEGATIVE
CALCIUM SERPL-MCNC: 7.4 MG/DL (ref 8.7–10.5)
CANNABINOIDS UR QL SCN: NEGATIVE
CHLORIDE SERPL-SCNC: 100 MMOL/L (ref 95–110)
CLARITY UR: ABNORMAL
CO2 SERPL-SCNC: 28 MMOL/L (ref 23–29)
COLOR UR: YELLOW
CREAT SERPL-MCNC: 1 MG/DL (ref 0.5–1.4)
CREAT UR-MCNC: 95.5 MG/DL (ref 15–325)
CTP QC/QA: YES
DIFFERENTIAL METHOD: ABNORMAL
EOSINOPHIL # BLD AUTO: 0.3 K/UL (ref 0–0.5)
EOSINOPHIL NFR BLD: 2.7 % (ref 0–8)
ERYTHROCYTE [DISTWIDTH] IN BLOOD BY AUTOMATED COUNT: 13.2 % (ref 11.5–14.5)
EST. GFR  (AFRICAN AMERICAN): >60 ML/MIN/1.73 M^2
EST. GFR  (NON AFRICAN AMERICAN): 55.2 ML/MIN/1.73 M^2
ESTIMATED AVG GLUCOSE: 105 MG/DL (ref 68–131)
ETHANOL SERPL-MCNC: <3 MG/DL
GLUCOSE SERPL-MCNC: 142 MG/DL (ref 70–110)
GLUCOSE UR QL STRIP: NEGATIVE
HBA1C MFR BLD: 5.3 % (ref 4–5.6)
HCT VFR BLD AUTO: 32.7 % (ref 37–48.5)
HGB BLD-MCNC: 10.3 G/DL (ref 12–16)
HGB UR QL STRIP: NEGATIVE
HYALINE CASTS #/AREA URNS LPF: 0 /LPF
IMM GRANULOCYTES # BLD AUTO: 0.07 K/UL (ref 0–0.04)
IMM GRANULOCYTES NFR BLD AUTO: 0.7 % (ref 0–0.5)
KETONES UR QL STRIP: NEGATIVE
LEUKOCYTE ESTERASE UR QL STRIP: NEGATIVE
LITHIUM SERPL-SCNC: 0.7 MMOL/L (ref 0.6–1.2)
LYMPHOCYTES # BLD AUTO: 2.1 K/UL (ref 1–4.8)
LYMPHOCYTES NFR BLD: 21.5 % (ref 18–48)
MCH RBC QN AUTO: 28.5 PG (ref 27–31)
MCHC RBC AUTO-ENTMCNC: 31.5 G/DL (ref 32–36)
MCV RBC AUTO: 91 FL (ref 82–98)
METHADONE UR QL SCN>300 NG/ML: NEGATIVE
MICROSCOPIC COMMENT: ABNORMAL
MONOCYTES # BLD AUTO: 0.8 K/UL (ref 0.3–1)
MONOCYTES NFR BLD: 8.5 % (ref 4–15)
NEUTROPHILS # BLD AUTO: 6.3 K/UL (ref 1.8–7.7)
NEUTROPHILS NFR BLD: 66 % (ref 38–73)
NITRITE UR QL STRIP: NEGATIVE
NRBC BLD-RTO: 0 /100 WBC
OPIATES UR QL SCN: NEGATIVE
PCP UR QL SCN>25 NG/ML: NEGATIVE
PH UR STRIP: 6 [PH] (ref 5–8)
PLATELET # BLD AUTO: 300 K/UL (ref 150–450)
PMV BLD AUTO: 8.8 FL (ref 9.2–12.9)
POTASSIUM SERPL-SCNC: 3.6 MMOL/L (ref 3.5–5.1)
PROT SERPL-MCNC: 6.3 G/DL (ref 6–8.4)
PROT UR QL STRIP: NEGATIVE
RBC # BLD AUTO: 3.61 M/UL (ref 4–5.4)
RBC #/AREA URNS HPF: 4 /HPF (ref 0–4)
SARS-COV-2 RDRP RESP QL NAA+PROBE: NEGATIVE
SODIUM SERPL-SCNC: 136 MMOL/L (ref 136–145)
SP GR UR STRIP: 1.02 (ref 1–1.03)
SQUAMOUS #/AREA URNS HPF: >36 /HPF
TOXICOLOGY INFORMATION: NORMAL
URN SPEC COLLECT METH UR: ABNORMAL
UROBILINOGEN UR STRIP-ACNC: NEGATIVE EU/DL
WBC # BLD AUTO: 9.52 K/UL (ref 3.9–12.7)
WBC #/AREA URNS HPF: 11 /HPF (ref 0–5)

## 2021-03-31 PROCEDURE — 80307 DRUG TEST PRSMV CHEM ANLYZR: CPT | Performed by: EMERGENCY MEDICINE

## 2021-03-31 PROCEDURE — 85025 COMPLETE CBC W/AUTO DIFF WBC: CPT | Performed by: EMERGENCY MEDICINE

## 2021-03-31 PROCEDURE — 11400000 HC PSYCH PRIVATE ROOM

## 2021-03-31 PROCEDURE — 80178 ASSAY OF LITHIUM: CPT | Performed by: EMERGENCY MEDICINE

## 2021-03-31 PROCEDURE — 36415 COLL VENOUS BLD VENIPUNCTURE: CPT | Performed by: EMERGENCY MEDICINE

## 2021-03-31 PROCEDURE — 99285 EMERGENCY DEPT VISIT HI MDM: CPT

## 2021-03-31 PROCEDURE — 36415 COLL VENOUS BLD VENIPUNCTURE: CPT | Performed by: PSYCHIATRY & NEUROLOGY

## 2021-03-31 PROCEDURE — 82077 ASSAY SPEC XCP UR&BREATH IA: CPT | Performed by: EMERGENCY MEDICINE

## 2021-03-31 PROCEDURE — 87086 URINE CULTURE/COLONY COUNT: CPT | Performed by: EMERGENCY MEDICINE

## 2021-03-31 PROCEDURE — 25000003 PHARM REV CODE 250: Performed by: PSYCHIATRY & NEUROLOGY

## 2021-03-31 PROCEDURE — 80053 COMPREHEN METABOLIC PANEL: CPT | Performed by: EMERGENCY MEDICINE

## 2021-03-31 PROCEDURE — U0002 COVID-19 LAB TEST NON-CDC: HCPCS | Performed by: EMERGENCY MEDICINE

## 2021-03-31 PROCEDURE — 81000 URINALYSIS NONAUTO W/SCOPE: CPT | Mod: 59 | Performed by: EMERGENCY MEDICINE

## 2021-03-31 PROCEDURE — 83036 HEMOGLOBIN GLYCOSYLATED A1C: CPT | Performed by: PSYCHIATRY & NEUROLOGY

## 2021-03-31 RX ORDER — HALOPERIDOL 5 MG/1
5 TABLET ORAL EVERY 4 HOURS PRN
Status: DISCONTINUED | OUTPATIENT
Start: 2021-03-31 | End: 2021-04-12 | Stop reason: HOSPADM

## 2021-03-31 RX ORDER — HALOPERIDOL 5 MG/ML
5 INJECTION INTRAMUSCULAR EVERY 4 HOURS PRN
Status: DISCONTINUED | OUTPATIENT
Start: 2021-03-31 | End: 2021-04-12 | Stop reason: HOSPADM

## 2021-03-31 RX ORDER — DIPHENHYDRAMINE HYDROCHLORIDE 50 MG/ML
50 INJECTION INTRAMUSCULAR; INTRAVENOUS EVERY 4 HOURS PRN
Status: DISCONTINUED | OUTPATIENT
Start: 2021-03-31 | End: 2021-04-12 | Stop reason: HOSPADM

## 2021-03-31 RX ORDER — MAG HYDROX/ALUMINUM HYD/SIMETH 200-200-20
30 SUSPENSION, ORAL (FINAL DOSE FORM) ORAL EVERY 4 HOURS PRN
Status: DISCONTINUED | OUTPATIENT
Start: 2021-03-31 | End: 2021-04-12 | Stop reason: HOSPADM

## 2021-03-31 RX ORDER — LITHIUM CARBONATE 150 MG/1
300 CAPSULE ORAL NIGHTLY
Status: DISCONTINUED | OUTPATIENT
Start: 2021-03-31 | End: 2021-04-02

## 2021-03-31 RX ORDER — LEVOTHYROXINE SODIUM 75 UG/1
75 TABLET ORAL
Status: DISCONTINUED | OUTPATIENT
Start: 2021-04-01 | End: 2021-04-12 | Stop reason: HOSPADM

## 2021-03-31 RX ORDER — METOPROLOL SUCCINATE 25 MG/1
50 TABLET, EXTENDED RELEASE ORAL DAILY
Status: DISCONTINUED | OUTPATIENT
Start: 2021-04-01 | End: 2021-04-12 | Stop reason: HOSPADM

## 2021-03-31 RX ORDER — QUETIAPINE FUMARATE 100 MG/1
100 TABLET, FILM COATED ORAL NIGHTLY
Status: DISCONTINUED | OUTPATIENT
Start: 2021-03-31 | End: 2021-04-01

## 2021-03-31 RX ORDER — ACETAMINOPHEN 325 MG/1
650 TABLET ORAL EVERY 6 HOURS PRN
Status: DISCONTINUED | OUTPATIENT
Start: 2021-03-31 | End: 2021-04-12 | Stop reason: HOSPADM

## 2021-03-31 RX ORDER — DIPHENHYDRAMINE HCL 50 MG
50 CAPSULE ORAL EVERY 4 HOURS PRN
Status: DISCONTINUED | OUTPATIENT
Start: 2021-03-31 | End: 2021-04-12 | Stop reason: HOSPADM

## 2021-03-31 RX ADMIN — QUETIAPINE FUMARATE 100 MG: 100 TABLET ORAL at 08:03

## 2021-03-31 RX ADMIN — LITHIUM CARBONATE 300 MG: 150 CAPSULE ORAL at 08:03

## 2021-04-01 LAB
CHOLEST SERPL-MCNC: 199 MG/DL (ref 120–199)
CHOLEST/HDLC SERPL: 2.8 {RATIO} (ref 2–5)
HDLC SERPL-MCNC: 71 MG/DL (ref 40–75)
HDLC SERPL: 35.7 % (ref 20–50)
LDLC SERPL CALC-MCNC: 96.6 MG/DL (ref 63–159)
NONHDLC SERPL-MCNC: 128 MG/DL
TRIGL SERPL-MCNC: 157 MG/DL (ref 30–150)

## 2021-04-01 PROCEDURE — 11400000 HC PSYCH PRIVATE ROOM

## 2021-04-01 PROCEDURE — 25000003 PHARM REV CODE 250: Performed by: INTERNAL MEDICINE

## 2021-04-01 PROCEDURE — 25000003 PHARM REV CODE 250: Performed by: PSYCHIATRY & NEUROLOGY

## 2021-04-01 PROCEDURE — 80061 LIPID PANEL: CPT | Performed by: PSYCHIATRY & NEUROLOGY

## 2021-04-01 PROCEDURE — 25000003 PHARM REV CODE 250: Performed by: EMERGENCY MEDICINE

## 2021-04-01 PROCEDURE — 36415 COLL VENOUS BLD VENIPUNCTURE: CPT | Performed by: PSYCHIATRY & NEUROLOGY

## 2021-04-01 RX ORDER — RISPERIDONE 1 MG/1
1 TABLET ORAL DAILY
Status: DISCONTINUED | OUTPATIENT
Start: 2021-04-01 | End: 2021-04-01

## 2021-04-01 RX ORDER — FUROSEMIDE 20 MG/1
40 TABLET ORAL 2 TIMES DAILY
Status: DISCONTINUED | OUTPATIENT
Start: 2021-04-01 | End: 2021-04-12 | Stop reason: HOSPADM

## 2021-04-01 RX ORDER — RISPERIDONE 1 MG/1
2 TABLET ORAL NIGHTLY
Status: DISCONTINUED | OUTPATIENT
Start: 2021-04-01 | End: 2021-04-01

## 2021-04-01 RX ORDER — QUETIAPINE FUMARATE 100 MG/1
200 TABLET, FILM COATED ORAL NIGHTLY
Status: DISCONTINUED | OUTPATIENT
Start: 2021-04-01 | End: 2021-04-03

## 2021-04-01 RX ADMIN — FUROSEMIDE 40 MG: 20 TABLET ORAL at 08:04

## 2021-04-01 RX ADMIN — QUETIAPINE FUMARATE 200 MG: 100 TABLET ORAL at 09:04

## 2021-04-01 RX ADMIN — LEVOTHYROXINE SODIUM 75 MCG: 75 TABLET ORAL at 08:04

## 2021-04-01 RX ADMIN — FUROSEMIDE 40 MG: 20 TABLET ORAL at 06:04

## 2021-04-01 RX ADMIN — RISPERIDONE 1 MG: 1 TABLET ORAL at 08:04

## 2021-04-01 RX ADMIN — METOPROLOL SUCCINATE 50 MG: 25 TABLET, EXTENDED RELEASE ORAL at 08:04

## 2021-04-01 RX ADMIN — LITHIUM CARBONATE 300 MG: 150 CAPSULE ORAL at 09:04

## 2021-04-02 LAB
BACTERIA UR CULT: NORMAL
BACTERIA UR CULT: NORMAL

## 2021-04-02 PROCEDURE — 11400000 HC PSYCH PRIVATE ROOM

## 2021-04-02 PROCEDURE — 25000003 PHARM REV CODE 250: Performed by: INTERNAL MEDICINE

## 2021-04-02 PROCEDURE — 25000003 PHARM REV CODE 250: Performed by: PSYCHIATRY & NEUROLOGY

## 2021-04-02 PROCEDURE — 25000003 PHARM REV CODE 250: Performed by: EMERGENCY MEDICINE

## 2021-04-02 RX ORDER — LITHIUM CARBONATE 150 MG/1
450 CAPSULE ORAL NIGHTLY
Status: DISCONTINUED | OUTPATIENT
Start: 2021-04-02 | End: 2021-04-04

## 2021-04-02 RX ADMIN — METOPROLOL SUCCINATE 50 MG: 25 TABLET, EXTENDED RELEASE ORAL at 08:04

## 2021-04-02 RX ADMIN — LEVOTHYROXINE SODIUM 75 MCG: 75 TABLET ORAL at 05:04

## 2021-04-02 RX ADMIN — ACETAMINOPHEN 650 MG: 325 TABLET ORAL at 01:04

## 2021-04-02 RX ADMIN — FUROSEMIDE 40 MG: 20 TABLET ORAL at 06:04

## 2021-04-02 RX ADMIN — LITHIUM CARBONATE 450 MG: 150 CAPSULE ORAL at 08:04

## 2021-04-02 RX ADMIN — FUROSEMIDE 40 MG: 20 TABLET ORAL at 08:04

## 2021-04-02 RX ADMIN — QUETIAPINE FUMARATE 200 MG: 100 TABLET ORAL at 08:04

## 2021-04-03 PROCEDURE — 11400000 HC PSYCH PRIVATE ROOM

## 2021-04-03 PROCEDURE — 99233 PR SUBSEQUENT HOSPITAL CARE,LEVL III: ICD-10-PCS | Mod: ,,, | Performed by: PSYCHIATRY & NEUROLOGY

## 2021-04-03 PROCEDURE — 25000003 PHARM REV CODE 250: Performed by: PSYCHIATRY & NEUROLOGY

## 2021-04-03 PROCEDURE — 99233 SBSQ HOSP IP/OBS HIGH 50: CPT | Mod: ,,, | Performed by: PSYCHIATRY & NEUROLOGY

## 2021-04-03 PROCEDURE — 25000003 PHARM REV CODE 250: Performed by: INTERNAL MEDICINE

## 2021-04-03 PROCEDURE — 25000003 PHARM REV CODE 250: Performed by: EMERGENCY MEDICINE

## 2021-04-03 RX ORDER — QUETIAPINE FUMARATE 100 MG/1
100 TABLET, FILM COATED ORAL NIGHTLY
Status: DISCONTINUED | OUTPATIENT
Start: 2021-04-03 | End: 2021-04-12 | Stop reason: HOSPADM

## 2021-04-03 RX ADMIN — METOPROLOL SUCCINATE 50 MG: 25 TABLET, EXTENDED RELEASE ORAL at 08:04

## 2021-04-03 RX ADMIN — LEVOTHYROXINE SODIUM 75 MCG: 75 TABLET ORAL at 06:04

## 2021-04-03 RX ADMIN — FUROSEMIDE 40 MG: 20 TABLET ORAL at 08:04

## 2021-04-03 RX ADMIN — LITHIUM CARBONATE 450 MG: 150 CAPSULE ORAL at 08:04

## 2021-04-03 RX ADMIN — FUROSEMIDE 40 MG: 20 TABLET ORAL at 05:04

## 2021-04-03 RX ADMIN — QUETIAPINE FUMARATE 100 MG: 100 TABLET ORAL at 08:04

## 2021-04-04 PROCEDURE — 25000003 PHARM REV CODE 250: Performed by: INTERNAL MEDICINE

## 2021-04-04 PROCEDURE — 25000003 PHARM REV CODE 250: Performed by: EMERGENCY MEDICINE

## 2021-04-04 PROCEDURE — 99233 PR SUBSEQUENT HOSPITAL CARE,LEVL III: ICD-10-PCS | Mod: ,,, | Performed by: PSYCHIATRY & NEUROLOGY

## 2021-04-04 PROCEDURE — 25000003 PHARM REV CODE 250: Performed by: PSYCHIATRY & NEUROLOGY

## 2021-04-04 PROCEDURE — 99233 SBSQ HOSP IP/OBS HIGH 50: CPT | Mod: ,,, | Performed by: PSYCHIATRY & NEUROLOGY

## 2021-04-04 PROCEDURE — 11400000 HC PSYCH PRIVATE ROOM

## 2021-04-04 RX ORDER — LITHIUM CARBONATE 150 MG/1
300 CAPSULE ORAL 2 TIMES DAILY
Status: DISCONTINUED | OUTPATIENT
Start: 2021-04-05 | End: 2021-04-12 | Stop reason: HOSPADM

## 2021-04-04 RX ORDER — SYRING-NEEDL,DISP,INSUL,0.3 ML 29 G X1/2"
296 SYRINGE, EMPTY DISPOSABLE MISCELLANEOUS ONCE
Status: COMPLETED | OUTPATIENT
Start: 2021-04-04 | End: 2021-04-04

## 2021-04-04 RX ADMIN — METOPROLOL SUCCINATE 50 MG: 25 TABLET, EXTENDED RELEASE ORAL at 08:04

## 2021-04-04 RX ADMIN — MAGNESIUM CITRATE 296 ML: 1.75 LIQUID ORAL at 10:04

## 2021-04-04 RX ADMIN — LEVOTHYROXINE SODIUM 75 MCG: 75 TABLET ORAL at 06:04

## 2021-04-04 RX ADMIN — QUETIAPINE FUMARATE 100 MG: 100 TABLET ORAL at 08:04

## 2021-04-04 RX ADMIN — FUROSEMIDE 40 MG: 20 TABLET ORAL at 08:04

## 2021-04-04 RX ADMIN — LITHIUM CARBONATE 450 MG: 150 CAPSULE ORAL at 08:04

## 2021-04-05 LAB
BILIRUB UR QL STRIP: NEGATIVE
CLARITY UR: CLEAR
COLOR UR: YELLOW
GLUCOSE UR QL STRIP: NEGATIVE
HGB UR QL STRIP: NEGATIVE
KETONES UR QL STRIP: NEGATIVE
LEUKOCYTE ESTERASE UR QL STRIP: NEGATIVE
NITRITE UR QL STRIP: NEGATIVE
PH UR STRIP: 6 [PH] (ref 5–8)
PROT UR QL STRIP: NEGATIVE
SP GR UR STRIP: 1.01 (ref 1–1.03)
URN SPEC COLLECT METH UR: NORMAL
UROBILINOGEN UR STRIP-ACNC: NEGATIVE EU/DL

## 2021-04-05 PROCEDURE — 81003 URINALYSIS AUTO W/O SCOPE: CPT | Performed by: INTERNAL MEDICINE

## 2021-04-05 PROCEDURE — 25000003 PHARM REV CODE 250: Performed by: INTERNAL MEDICINE

## 2021-04-05 PROCEDURE — 99232 SBSQ HOSP IP/OBS MODERATE 35: CPT | Mod: ,,, | Performed by: PSYCHIATRY & NEUROLOGY

## 2021-04-05 PROCEDURE — 25000003 PHARM REV CODE 250: Performed by: EMERGENCY MEDICINE

## 2021-04-05 PROCEDURE — 99232 PR SUBSEQUENT HOSPITAL CARE,LEVL II: ICD-10-PCS | Mod: ,,, | Performed by: PSYCHIATRY & NEUROLOGY

## 2021-04-05 PROCEDURE — 11400000 HC PSYCH PRIVATE ROOM

## 2021-04-05 PROCEDURE — 25000003 PHARM REV CODE 250: Performed by: PSYCHIATRY & NEUROLOGY

## 2021-04-05 RX ADMIN — QUETIAPINE FUMARATE 100 MG: 100 TABLET ORAL at 08:04

## 2021-04-05 RX ADMIN — FUROSEMIDE 40 MG: 20 TABLET ORAL at 08:04

## 2021-04-05 RX ADMIN — METOPROLOL SUCCINATE 50 MG: 25 TABLET, EXTENDED RELEASE ORAL at 08:04

## 2021-04-05 RX ADMIN — LITHIUM CARBONATE 300 MG: 150 CAPSULE ORAL at 08:04

## 2021-04-05 RX ADMIN — ACETAMINOPHEN 650 MG: 325 TABLET ORAL at 01:04

## 2021-04-05 RX ADMIN — LEVOTHYROXINE SODIUM 75 MCG: 75 TABLET ORAL at 06:04

## 2021-04-05 RX ADMIN — FUROSEMIDE 40 MG: 20 TABLET ORAL at 06:04

## 2021-04-05 RX ADMIN — ALUMINUM HYDROXIDE, MAGNESIUM HYDROXIDE, AND SIMETHICONE 30 ML: 200; 200; 20 SUSPENSION ORAL at 11:04

## 2021-04-06 PROCEDURE — 25000003 PHARM REV CODE 250: Performed by: PSYCHIATRY & NEUROLOGY

## 2021-04-06 PROCEDURE — 11400000 HC PSYCH PRIVATE ROOM

## 2021-04-06 PROCEDURE — 25000003 PHARM REV CODE 250: Performed by: EMERGENCY MEDICINE

## 2021-04-06 PROCEDURE — 25000003 PHARM REV CODE 250: Performed by: INTERNAL MEDICINE

## 2021-04-06 RX ADMIN — ALUMINUM HYDROXIDE, MAGNESIUM HYDROXIDE, AND SIMETHICONE 30 ML: 200; 200; 20 SUSPENSION ORAL at 07:04

## 2021-04-06 RX ADMIN — LITHIUM CARBONATE 300 MG: 150 CAPSULE ORAL at 08:04

## 2021-04-06 RX ADMIN — METOPROLOL SUCCINATE 50 MG: 25 TABLET, EXTENDED RELEASE ORAL at 08:04

## 2021-04-06 RX ADMIN — LEVOTHYROXINE SODIUM 75 MCG: 75 TABLET ORAL at 05:04

## 2021-04-06 RX ADMIN — LITHIUM CARBONATE 300 MG: 150 CAPSULE ORAL at 09:04

## 2021-04-06 RX ADMIN — QUETIAPINE FUMARATE 100 MG: 100 TABLET ORAL at 09:04

## 2021-04-06 RX ADMIN — FUROSEMIDE 40 MG: 20 TABLET ORAL at 08:04

## 2021-04-07 PROCEDURE — 99231 PR SUBSEQUENT HOSPITAL CARE,LEVL I: ICD-10-PCS | Mod: ICN,CMP,, | Performed by: PSYCHIATRY & NEUROLOGY

## 2021-04-07 PROCEDURE — 11400000 HC PSYCH PRIVATE ROOM

## 2021-04-07 PROCEDURE — 25000003 PHARM REV CODE 250: Performed by: EMERGENCY MEDICINE

## 2021-04-07 PROCEDURE — 25000003 PHARM REV CODE 250: Performed by: INTERNAL MEDICINE

## 2021-04-07 PROCEDURE — 25000003 PHARM REV CODE 250: Performed by: PSYCHIATRY & NEUROLOGY

## 2021-04-07 PROCEDURE — 99231 SBSQ HOSP IP/OBS SF/LOW 25: CPT | Mod: ICN,CMP,, | Performed by: PSYCHIATRY & NEUROLOGY

## 2021-04-07 RX ADMIN — QUETIAPINE FUMARATE 100 MG: 100 TABLET ORAL at 08:04

## 2021-04-07 RX ADMIN — LITHIUM CARBONATE 300 MG: 150 CAPSULE ORAL at 08:04

## 2021-04-07 RX ADMIN — FUROSEMIDE 40 MG: 20 TABLET ORAL at 09:04

## 2021-04-07 RX ADMIN — LITHIUM CARBONATE 300 MG: 150 CAPSULE ORAL at 09:04

## 2021-04-07 RX ADMIN — LEVOTHYROXINE SODIUM 75 MCG: 75 TABLET ORAL at 06:04

## 2021-04-07 RX ADMIN — METOPROLOL SUCCINATE 50 MG: 25 TABLET, EXTENDED RELEASE ORAL at 09:04

## 2021-04-08 LAB — LITHIUM SERPL-SCNC: 0.6 MMOL/L (ref 0.6–1.2)

## 2021-04-08 PROCEDURE — 25000003 PHARM REV CODE 250: Performed by: PSYCHIATRY & NEUROLOGY

## 2021-04-08 PROCEDURE — 80178 ASSAY OF LITHIUM: CPT | Performed by: PSYCHIATRY & NEUROLOGY

## 2021-04-08 PROCEDURE — 36415 COLL VENOUS BLD VENIPUNCTURE: CPT | Performed by: PSYCHIATRY & NEUROLOGY

## 2021-04-08 PROCEDURE — 25000003 PHARM REV CODE 250: Performed by: INTERNAL MEDICINE

## 2021-04-08 PROCEDURE — 25000003 PHARM REV CODE 250: Performed by: EMERGENCY MEDICINE

## 2021-04-08 PROCEDURE — 11400000 HC PSYCH PRIVATE ROOM

## 2021-04-08 RX ADMIN — METOPROLOL SUCCINATE 50 MG: 25 TABLET, EXTENDED RELEASE ORAL at 08:04

## 2021-04-08 RX ADMIN — QUETIAPINE FUMARATE 100 MG: 100 TABLET ORAL at 08:04

## 2021-04-08 RX ADMIN — LITHIUM CARBONATE 300 MG: 150 CAPSULE ORAL at 08:04

## 2021-04-08 RX ADMIN — LEVOTHYROXINE SODIUM 75 MCG: 75 TABLET ORAL at 06:04

## 2021-04-08 RX ADMIN — FUROSEMIDE 40 MG: 20 TABLET ORAL at 08:04

## 2021-04-08 RX ADMIN — FUROSEMIDE 40 MG: 20 TABLET ORAL at 06:04

## 2021-04-09 PROCEDURE — 25000003 PHARM REV CODE 250: Performed by: EMERGENCY MEDICINE

## 2021-04-09 PROCEDURE — 11400000 HC PSYCH PRIVATE ROOM

## 2021-04-09 PROCEDURE — 25000003 PHARM REV CODE 250: Performed by: PSYCHIATRY & NEUROLOGY

## 2021-04-09 PROCEDURE — 25000003 PHARM REV CODE 250: Performed by: INTERNAL MEDICINE

## 2021-04-09 RX ADMIN — QUETIAPINE FUMARATE 100 MG: 100 TABLET ORAL at 08:04

## 2021-04-09 RX ADMIN — LITHIUM CARBONATE 300 MG: 150 CAPSULE ORAL at 08:04

## 2021-04-09 RX ADMIN — METOPROLOL SUCCINATE 50 MG: 25 TABLET, EXTENDED RELEASE ORAL at 08:04

## 2021-04-09 RX ADMIN — LEVOTHYROXINE SODIUM 75 MCG: 75 TABLET ORAL at 06:04

## 2021-04-09 RX ADMIN — FUROSEMIDE 40 MG: 20 TABLET ORAL at 06:04

## 2021-04-09 RX ADMIN — FUROSEMIDE 40 MG: 20 TABLET ORAL at 08:04

## 2021-04-10 PROCEDURE — 11400000 HC PSYCH PRIVATE ROOM

## 2021-04-10 PROCEDURE — 25000003 PHARM REV CODE 250: Performed by: EMERGENCY MEDICINE

## 2021-04-10 PROCEDURE — 25000003 PHARM REV CODE 250: Performed by: PSYCHIATRY & NEUROLOGY

## 2021-04-10 PROCEDURE — 25000003 PHARM REV CODE 250: Performed by: INTERNAL MEDICINE

## 2021-04-10 RX ADMIN — LITHIUM CARBONATE 300 MG: 150 CAPSULE ORAL at 09:04

## 2021-04-10 RX ADMIN — QUETIAPINE FUMARATE 100 MG: 100 TABLET ORAL at 09:04

## 2021-04-10 RX ADMIN — LEVOTHYROXINE SODIUM 75 MCG: 75 TABLET ORAL at 05:04

## 2021-04-10 RX ADMIN — FUROSEMIDE 40 MG: 20 TABLET ORAL at 05:04

## 2021-04-10 RX ADMIN — FUROSEMIDE 40 MG: 20 TABLET ORAL at 08:04

## 2021-04-10 RX ADMIN — METOPROLOL SUCCINATE 50 MG: 25 TABLET, EXTENDED RELEASE ORAL at 08:04

## 2021-04-10 RX ADMIN — LITHIUM CARBONATE 300 MG: 150 CAPSULE ORAL at 08:04

## 2021-04-11 PROCEDURE — 25000003 PHARM REV CODE 250: Performed by: EMERGENCY MEDICINE

## 2021-04-11 PROCEDURE — 25000003 PHARM REV CODE 250: Performed by: PSYCHIATRY & NEUROLOGY

## 2021-04-11 PROCEDURE — 25000003 PHARM REV CODE 250: Performed by: INTERNAL MEDICINE

## 2021-04-11 PROCEDURE — 11400000 HC PSYCH PRIVATE ROOM

## 2021-04-11 RX ORDER — CLONAZEPAM 1 MG/1
1 TABLET ORAL NIGHTLY PRN
Status: DISCONTINUED | OUTPATIENT
Start: 2021-04-11 | End: 2021-04-12 | Stop reason: HOSPADM

## 2021-04-11 RX ADMIN — METOPROLOL SUCCINATE 50 MG: 25 TABLET, EXTENDED RELEASE ORAL at 08:04

## 2021-04-11 RX ADMIN — LITHIUM CARBONATE 300 MG: 150 CAPSULE ORAL at 08:04

## 2021-04-11 RX ADMIN — LEVOTHYROXINE SODIUM 75 MCG: 75 TABLET ORAL at 06:04

## 2021-04-11 RX ADMIN — QUETIAPINE FUMARATE 100 MG: 100 TABLET ORAL at 08:04

## 2021-04-11 RX ADMIN — CLONAZEPAM 1 MG: 1 TABLET ORAL at 08:04

## 2021-04-11 RX ADMIN — FUROSEMIDE 40 MG: 20 TABLET ORAL at 08:04

## 2021-04-11 RX ADMIN — FUROSEMIDE 40 MG: 20 TABLET ORAL at 05:04

## 2021-04-12 VITALS
OXYGEN SATURATION: 97 % | HEART RATE: 63 BPM | DIASTOLIC BLOOD PRESSURE: 59 MMHG | BODY MASS INDEX: 33.8 KG/M2 | SYSTOLIC BLOOD PRESSURE: 129 MMHG | TEMPERATURE: 98 F | RESPIRATION RATE: 16 BRPM | HEIGHT: 64 IN | WEIGHT: 198 LBS

## 2021-04-12 PROCEDURE — 25000003 PHARM REV CODE 250: Performed by: EMERGENCY MEDICINE

## 2021-04-12 PROCEDURE — 99239 PR HOSPITAL DISCHARGE DAY,>30 MIN: ICD-10-PCS | Mod: ,,, | Performed by: PSYCHIATRY & NEUROLOGY

## 2021-04-12 PROCEDURE — 25000003 PHARM REV CODE 250: Performed by: INTERNAL MEDICINE

## 2021-04-12 PROCEDURE — 25000003 PHARM REV CODE 250: Performed by: PSYCHIATRY & NEUROLOGY

## 2021-04-12 PROCEDURE — 99239 HOSP IP/OBS DSCHRG MGMT >30: CPT | Mod: ,,, | Performed by: PSYCHIATRY & NEUROLOGY

## 2021-04-12 RX ORDER — FUROSEMIDE 40 MG/1
40 TABLET ORAL 2 TIMES DAILY
Qty: 60 TABLET | Refills: 0 | Status: SHIPPED | OUTPATIENT
Start: 2021-04-12 | End: 2021-05-28 | Stop reason: ALTCHOICE

## 2021-04-12 RX ORDER — LITHIUM CARBONATE 300 MG/1
300 CAPSULE ORAL 2 TIMES DAILY
Qty: 60 CAPSULE | Refills: 0 | Status: SHIPPED | OUTPATIENT
Start: 2021-04-12 | End: 2021-12-13 | Stop reason: HOSPADM

## 2021-04-12 RX ORDER — QUETIAPINE FUMARATE 100 MG/1
100 TABLET, FILM COATED ORAL NIGHTLY
Qty: 30 TABLET | Refills: 0 | Status: SHIPPED | OUTPATIENT
Start: 2021-04-12 | End: 2021-12-13 | Stop reason: DRUGHIGH

## 2021-04-12 RX ORDER — LEVOTHYROXINE SODIUM 75 UG/1
75 TABLET ORAL
Qty: 30 TABLET | Refills: 11 | Status: SHIPPED | OUTPATIENT
Start: 2021-04-13 | End: 2022-05-11

## 2021-04-12 RX ADMIN — METOPROLOL SUCCINATE 50 MG: 25 TABLET, EXTENDED RELEASE ORAL at 09:04

## 2021-04-12 RX ADMIN — LEVOTHYROXINE SODIUM 75 MCG: 75 TABLET ORAL at 05:04

## 2021-04-12 RX ADMIN — FUROSEMIDE 40 MG: 20 TABLET ORAL at 09:04

## 2021-04-12 RX ADMIN — LITHIUM CARBONATE 300 MG: 150 CAPSULE ORAL at 09:04

## 2021-04-13 ENCOUNTER — PATIENT OUTREACH (OUTPATIENT)
Dept: ADMINISTRATIVE | Facility: CLINIC | Age: 76
End: 2021-04-13

## 2021-05-19 PROBLEM — R11.0 NAUSEA: Status: ACTIVE | Noted: 2021-05-19

## 2021-06-22 PROBLEM — N39.0 FREQUENT UTI: Status: ACTIVE | Noted: 2021-06-22

## 2021-07-02 ENCOUNTER — LAB VISIT (OUTPATIENT)
Dept: LAB | Facility: HOSPITAL | Age: 76
End: 2021-07-02
Attending: PSYCHIATRY & NEUROLOGY
Payer: MEDICARE

## 2021-07-02 DIAGNOSIS — F31.32 MODERATE DEPRESSED BIPOLAR I DISORDER: Primary | ICD-10-CM

## 2021-07-02 LAB — LITHIUM SERPL-SCNC: 0.6 MMOL/L (ref 0.6–1.2)

## 2021-07-02 PROCEDURE — 80178 ASSAY OF LITHIUM: CPT | Performed by: PSYCHIATRY & NEUROLOGY

## 2021-07-02 PROCEDURE — 36415 COLL VENOUS BLD VENIPUNCTURE: CPT | Performed by: PSYCHIATRY & NEUROLOGY

## 2021-07-09 DIAGNOSIS — N39.41 URGENCY INCONTINENCE: Primary | ICD-10-CM

## 2021-07-14 ENCOUNTER — HOSPITAL ENCOUNTER (OUTPATIENT)
Dept: RADIOLOGY | Facility: HOSPITAL | Age: 76
Discharge: HOME | End: 2021-07-14
Attending: INTERNAL MEDICINE
Payer: MEDICARE

## 2021-07-14 DIAGNOSIS — N39.41 URGENCY INCONTINENCE: ICD-10-CM

## 2021-07-14 PROCEDURE — 76770 US EXAM ABDO BACK WALL COMP: CPT | Mod: TC

## 2021-07-15 PROBLEM — R42 VERTIGO: Status: ACTIVE | Noted: 2021-07-15

## 2021-10-15 PROBLEM — Z00.00 ROUTINE GENERAL MEDICAL EXAMINATION AT A HEALTH CARE FACILITY: Status: ACTIVE | Noted: 2021-10-15

## 2021-11-29 PROBLEM — E66.9 OBESITY: Status: ACTIVE | Noted: 2021-11-29

## 2021-12-07 ENCOUNTER — PATIENT OUTREACH (OUTPATIENT)
Dept: ADMINISTRATIVE | Facility: CLINIC | Age: 76
End: 2021-12-07
Payer: MEDICARE

## 2021-12-07 ENCOUNTER — EXTERNAL HOSPITAL ADMISSION (OUTPATIENT)
Dept: ADMINISTRATIVE | Facility: CLINIC | Age: 76
End: 2021-12-07
Payer: MEDICARE

## 2021-12-07 RX ORDER — BUPROPION HYDROCHLORIDE 450 MG/1
TABLET, FILM COATED, EXTENDED RELEASE ORAL DAILY
COMMUNITY
End: 2022-10-28

## 2022-01-10 ENCOUNTER — LAB VISIT (OUTPATIENT)
Dept: LAB | Facility: HOSPITAL | Age: 77
End: 2022-01-10
Attending: FAMILY MEDICINE
Payer: MEDICARE

## 2022-01-10 DIAGNOSIS — R11.0 NAUSEA: ICD-10-CM

## 2022-01-10 DIAGNOSIS — R68.83 CHILLS: ICD-10-CM

## 2022-01-10 PROBLEM — R11.2 NAUSEA AND VOMITING: Status: ACTIVE | Noted: 2022-01-10

## 2022-01-10 PROCEDURE — U0005 INFEC AGEN DETEC AMPLI PROBE: HCPCS | Performed by: FAMILY MEDICINE

## 2022-01-10 PROCEDURE — U0003 INFECTIOUS AGENT DETECTION BY NUCLEIC ACID (DNA OR RNA); SEVERE ACUTE RESPIRATORY SYNDROME CORONAVIRUS 2 (SARS-COV-2) (CORONAVIRUS DISEASE [COVID-19]), AMPLIFIED PROBE TECHNIQUE, MAKING USE OF HIGH THROUGHPUT TECHNOLOGIES AS DESCRIBED BY CMS-2020-01-R: HCPCS | Performed by: FAMILY MEDICINE

## 2022-01-11 LAB
SARS-COV-2 RNA RESP QL NAA+PROBE: NOT DETECTED
SARS-COV-2- CYCLE NUMBER: NORMAL

## 2022-01-17 PROBLEM — Z00.00 ROUTINE GENERAL MEDICAL EXAMINATION AT A HEALTH CARE FACILITY: Status: RESOLVED | Noted: 2021-10-15 | Resolved: 2022-01-17

## 2022-03-08 ENCOUNTER — PATIENT OUTREACH (OUTPATIENT)
Dept: ADMINISTRATIVE | Facility: HOSPITAL | Age: 77
End: 2022-03-08
Payer: MEDICARE

## 2022-03-09 PROBLEM — K59.01 SLOW TRANSIT CONSTIPATION: Status: ACTIVE | Noted: 2022-03-09

## 2022-03-09 PROBLEM — K59.00 CONSTIPATION: Status: ACTIVE | Noted: 2022-03-09

## 2022-04-11 PROBLEM — Z01.818 PRE-OP EXAM: Status: ACTIVE | Noted: 2022-04-11

## 2022-05-23 PROBLEM — M79.641 HAND PAIN, RIGHT: Status: ACTIVE | Noted: 2022-05-23

## 2022-06-13 PROBLEM — R42 DIZZINESS: Status: ACTIVE | Noted: 2022-06-13

## 2022-08-17 ENCOUNTER — HOSPITAL ENCOUNTER (EMERGENCY)
Facility: HOSPITAL | Age: 77
Discharge: LEFT BEFORE DISCHARGE | End: 2022-08-18
Attending: EMERGENCY MEDICINE
Payer: MEDICARE

## 2022-08-17 DIAGNOSIS — Z53.29 LEFT AGAINST MEDICAL ADVICE: Primary | ICD-10-CM

## 2022-08-17 DIAGNOSIS — T50.901A OVERDOSE: ICD-10-CM

## 2022-08-17 LAB
ALBUMIN SERPL BCP-MCNC: 3.8 G/DL (ref 3.5–5.2)
ALP SERPL-CCNC: 67 U/L (ref 55–135)
ALT SERPL W/O P-5'-P-CCNC: 22 U/L (ref 10–44)
ANION GAP SERPL CALC-SCNC: 5 MMOL/L (ref 8–16)
AST SERPL-CCNC: 9 U/L (ref 10–40)
BASOPHILS # BLD AUTO: 0.01 K/UL (ref 0–0.2)
BASOPHILS NFR BLD: 0.2 % (ref 0–1.9)
BILIRUB SERPL-MCNC: 0.8 MG/DL (ref 0.1–1)
BUN SERPL-MCNC: 20 MG/DL (ref 8–23)
CALCIUM SERPL-MCNC: 9.1 MG/DL (ref 8.7–10.5)
CHLORIDE SERPL-SCNC: 107 MMOL/L (ref 95–110)
CO2 SERPL-SCNC: 26 MMOL/L (ref 23–29)
CREAT SERPL-MCNC: 1 MG/DL (ref 0.5–1.4)
DIFFERENTIAL METHOD: ABNORMAL
EOSINOPHIL # BLD AUTO: 0 K/UL (ref 0–0.5)
EOSINOPHIL NFR BLD: 0 % (ref 0–8)
ERYTHROCYTE [DISTWIDTH] IN BLOOD BY AUTOMATED COUNT: 11.9 % (ref 11.5–14.5)
EST. GFR  (NO RACE VARIABLE): 58.4 ML/MIN/1.73 M^2
GLUCOSE SERPL-MCNC: 109 MG/DL (ref 70–110)
HCT VFR BLD AUTO: 36.4 % (ref 37–48.5)
HGB BLD-MCNC: 12 G/DL (ref 12–16)
IMM GRANULOCYTES # BLD AUTO: 0.02 K/UL (ref 0–0.04)
IMM GRANULOCYTES NFR BLD AUTO: 0.3 % (ref 0–0.5)
LYMPHOCYTES # BLD AUTO: 1 K/UL (ref 1–4.8)
LYMPHOCYTES NFR BLD: 15.5 % (ref 18–48)
MCH RBC QN AUTO: 28.2 PG (ref 27–31)
MCHC RBC AUTO-ENTMCNC: 33 G/DL (ref 32–36)
MCV RBC AUTO: 85 FL (ref 82–98)
MONOCYTES # BLD AUTO: 0.6 K/UL (ref 0.3–1)
MONOCYTES NFR BLD: 9.8 % (ref 4–15)
NEUTROPHILS # BLD AUTO: 4.6 K/UL (ref 1.8–7.7)
NEUTROPHILS NFR BLD: 74.2 % (ref 38–73)
NRBC BLD-RTO: 0 /100 WBC
PLATELET # BLD AUTO: 235 K/UL (ref 150–450)
PMV BLD AUTO: 8.6 FL (ref 9.2–12.9)
POTASSIUM SERPL-SCNC: 4.2 MMOL/L (ref 3.5–5.1)
PROT SERPL-MCNC: 7.5 G/DL (ref 6–8.4)
RBC # BLD AUTO: 4.26 M/UL (ref 4–5.4)
SODIUM SERPL-SCNC: 138 MMOL/L (ref 136–145)
WBC # BLD AUTO: 6.25 K/UL (ref 3.9–12.7)

## 2022-08-17 PROCEDURE — 93005 ELECTROCARDIOGRAM TRACING: CPT

## 2022-08-17 PROCEDURE — 93010 EKG 12-LEAD: ICD-10-PCS | Mod: ,,, | Performed by: INTERNAL MEDICINE

## 2022-08-17 PROCEDURE — 99284 EMERGENCY DEPT VISIT MOD MDM: CPT | Mod: 25

## 2022-08-17 PROCEDURE — 85025 COMPLETE CBC W/AUTO DIFF WBC: CPT | Performed by: EMERGENCY MEDICINE

## 2022-08-17 PROCEDURE — 80053 COMPREHEN METABOLIC PANEL: CPT | Performed by: EMERGENCY MEDICINE

## 2022-08-17 PROCEDURE — 36415 COLL VENOUS BLD VENIPUNCTURE: CPT | Performed by: EMERGENCY MEDICINE

## 2022-08-17 PROCEDURE — 93010 ELECTROCARDIOGRAM REPORT: CPT | Mod: ,,, | Performed by: INTERNAL MEDICINE

## 2022-08-17 NOTE — LETTER
Patient: Kaya Martinez  YOB: 1945  Date: 8/18/2022 Time: 6:03 AM  Location: [facility] Emergency Department    Leaving the [facility] Against Medical Advice    Chart #:[id number]    This will certify that I, the undersigned,    ______________________________________________________________________    A patient in the above named medical center, having requested discharge and removal from the medical [facility] against the advice of my attending physician(s), hereby release [facility], its physicians, officers and employees, severally and individually, from any and all liability of any nature whatsoever for any injury or harm or complication of any kind that may result directly or indirectly, by reason of my terminating my stay as a patient at [facility] and my departure from [facility], and hereby waive any and all rights of action I may now have or later acquire as a result of my voluntary departure from [facility] and the termination of my stay as a patient therein.    This release is made with the full knowledge of the danger that may result from the action which I am taking.      Date:_______________________                         ___________________________                                                                                    Patient/Legal Representative    Witness:        ____________________________                          ___________________________  Nurse                                                                        Physician

## 2022-08-17 NOTE — ED NOTES
Per Teodora Armenta with Poison Control, need to do basic labs (CMP, CBC), watch for seizures, agitations, confusion, tachycardia, and hypotension.  Also hallucinations.  Needs ECG and if QTC >450 give Magnesium and QRS >100 give Bicarb.  Can give Benzos for tachycardia and agitation prn.  Need to watch patient for minimum of 24 hours.

## 2022-08-18 VITALS
BODY MASS INDEX: 33.31 KG/M2 | HEIGHT: 64 IN | OXYGEN SATURATION: 99 % | DIASTOLIC BLOOD PRESSURE: 76 MMHG | HEART RATE: 69 BPM | RESPIRATION RATE: 18 BRPM | WEIGHT: 195.13 LBS | SYSTOLIC BLOOD PRESSURE: 144 MMHG | TEMPERATURE: 99 F

## 2022-08-18 NOTE — ED NOTES
Pt up to bathroom. Pt anxious in regards to the Emergency room bill and having to stay 24 hours. Upset that she took extra medications. Re-explained the reasons for monitoring.

## 2022-08-18 NOTE — ED NOTES
Pt resting, concerned about  at home. Discussed risks of leaving without completing observation time. Will continue to stay for observation.

## 2022-08-18 NOTE — ED NOTES
Pt awake, relates that her  has fallen at home and she doesn't know what to do.  Her front door is locked and she has the key.  [facility] key

## 2022-08-18 NOTE — ED NOTES
Pt requesting to leave AMA. Concerned about her  who fell at home. Does not feel comfortable with him home alone and her in the ED. Dr Quiroz and this nurse discussed the risks and symptoms to watch for at home. Pt verbalized understanding.

## 2022-10-24 PROBLEM — Z02.2 ENCOUNTER FOR EXAMINATION FOR ADMISSION TO ASSISTED LIVING FACILITY: Status: ACTIVE | Noted: 2022-10-24

## 2022-11-30 PROBLEM — Z23 ENCOUNTER FOR IMMUNIZATION: Status: ACTIVE | Noted: 2022-11-30

## 2023-01-11 PROBLEM — Z00.00 ROUTINE GENERAL MEDICAL EXAMINATION AT A HEALTH CARE FACILITY: Status: ACTIVE | Noted: 2023-01-11

## 2023-04-17 PROBLEM — Z00.00 ROUTINE GENERAL MEDICAL EXAMINATION AT A HEALTH CARE FACILITY: Status: RESOLVED | Noted: 2023-01-11 | Resolved: 2023-04-17

## 2023-06-02 ENCOUNTER — HOSPITAL ENCOUNTER (EMERGENCY)
Facility: HOSPITAL | Age: 78
Discharge: HOME | End: 2023-06-02
Attending: STUDENT IN AN ORGANIZED HEALTH CARE EDUCATION/TRAINING PROGRAM
Payer: MEDICARE

## 2023-06-02 VITALS
OXYGEN SATURATION: 96 % | RESPIRATION RATE: 17 BRPM | HEIGHT: 64 IN | HEART RATE: 81 BPM | TEMPERATURE: 98 F | DIASTOLIC BLOOD PRESSURE: 92 MMHG | BODY MASS INDEX: 33.97 KG/M2 | SYSTOLIC BLOOD PRESSURE: 161 MMHG | WEIGHT: 199 LBS

## 2023-06-02 DIAGNOSIS — S09.90XA INJURY OF HEAD, INITIAL ENCOUNTER: Primary | ICD-10-CM

## 2023-06-02 PROCEDURE — 99283 EMERGENCY DEPT VISIT LOW MDM: CPT

## 2023-06-02 RX ORDER — METHOCARBAMOL 500 MG/1
500 TABLET, FILM COATED ORAL 3 TIMES DAILY
Qty: 15 TABLET | Refills: 0 | Status: SHIPPED | OUTPATIENT
Start: 2023-06-02 | End: 2023-06-07

## 2023-06-02 NOTE — ED PROVIDER NOTES
Encounter Date: 6/2/2023       History     Chief Complaint   Patient presents with    Fall     Pt at the hair salon, tripped and fell.  Pt c/o pain to posterior head and lower back.      77-year-old female not on any blood thinner presents with mechanical fall.  Patient said that she hit the back of her head but denies any loss of consciousness, vomiting, headache, dizziness.    Review of patient's allergies indicates:   Allergen Reactions    Morphine Itching     Past Medical History:   Diagnosis Date    Anxiety     Arthritis     Cataract     Right Eye    Depression     GERD (gastroesophageal reflux disease)     Glaucoma     Hypertension     Manic behavior     KEV (obstructive sleep apnea)     Pre-op exam 4/11/2022    Thyroid disease      Past Surgical History:   Procedure Laterality Date    CATARACT EXTRACTION  1985    Dr. Jovan Cadena   Left Eye    CORNEAL TRANSPLANT  1993     Dr. Jovan Cadena  Left Eye    PENETRATING KERATOPLASTY      STRABISMUS SURGERY  1999    Dr. Jovan Cadena Left Eye    VITRECTOMY       Family History   Problem Relation Age of Onset    Cancer Mother 76        Colon    Cancer Father 62        prostate    Hypertension Father     Macular degeneration Father     Cancer Sister 9        brain tumor    Diabetes Paternal Aunt     Diabetes Maternal Grandmother      Social History     Tobacco Use    Smoking status: Never    Smokeless tobacco: Never   Substance Use Topics    Alcohol use: Yes     Alcohol/week: 0.0 standard drinks    Drug use: No     Review of Systems   Constitutional: Negative.    HENT: Negative.     Respiratory: Negative.     Cardiovascular: Negative.    Gastrointestinal: Negative.    Genitourinary: Negative.    Musculoskeletal: Negative.    Skin: Negative.    Neurological: Negative.    Psychiatric/Behavioral: Negative.     All other systems reviewed and are negative.    Physical Exam     Initial Vitals [06/02/23 1351]   BP Pulse Resp Temp SpO2   (!) 196/100 82 17 98 °F (36.7 °C) 97  %      MAP       --         Physical Exam    Nursing note and vitals reviewed.  Constitutional: Vital signs are normal. She appears well-developed and well-nourished.   HENT:   Head: Normocephalic and atraumatic.   Eyes: Conjunctivae and lids are normal. Pupils are equal, round, and reactive to light.   Neck: Trachea normal. Neck supple.   Cardiovascular:  Normal rate, regular rhythm, normal heart sounds, intact distal pulses and normal pulses.           No murmur heard.  Pulmonary/Chest: Breath sounds normal. No respiratory distress.   Abdominal: Abdomen is soft. Bowel sounds are normal.   Musculoskeletal:         General: Normal range of motion.      Cervical back: Neck supple.     Neurological: She is alert and oriented to person, place, and time. She has normal strength. No cranial nerve deficit or sensory deficit. GCS score is 15. GCS eye subscore is 4. GCS verbal subscore is 5. GCS motor subscore is 6.   Skin: Skin is warm. Capillary refill takes less than 2 seconds.   Psychiatric: She has a normal mood and affect. Her speech is normal. Thought content normal.       ED Course   Procedures  Labs Reviewed - No data to display       Imaging Results    None          Medications - No data to display  Medical Decision Making:   Initial Assessment:   77-year-old female not on any blood thinner presents with mechanical fall.  Hypertensive but improved once recheck.  GCS 15.  Offered CT brain but patient refuses.  Patient says that she just has to get a check for her assisted living facility.  Will discharge patient home with reassurance.  Patient ambulating without difficulty                        Clinical Impression:   Final diagnoses:  [S09.90XA] Injury of head, initial encounter (Primary)        ED Disposition Condition    Discharge Stable          ED Prescriptions    None       Follow-up Information       Follow up With Specialties Details Why Contact [facility], Internal  Medicine, Gynecology, Dental General Practice In 2 days  [address] [facility] [address]  [phone]               Pepe Quiroz MD  06/02/23 1842

## 2023-09-13 PROBLEM — M79.601 RIGHT ARM PAIN: Status: ACTIVE | Noted: 2023-09-13

## 2023-09-14 PROBLEM — M79.602 BILATERAL ARM PAIN: Status: ACTIVE | Noted: 2023-09-13

## 2023-09-26 ENCOUNTER — HOSPITAL ENCOUNTER (EMERGENCY)
Facility: HOSPITAL | Age: 78
Discharge: HOME | End: 2023-09-26
Attending: EMERGENCY MEDICINE
Payer: MEDICARE

## 2023-09-26 VITALS
HEART RATE: 87 BPM | DIASTOLIC BLOOD PRESSURE: 71 MMHG | TEMPERATURE: 98 F | SYSTOLIC BLOOD PRESSURE: 133 MMHG | BODY MASS INDEX: 33.29 KG/M2 | OXYGEN SATURATION: 97 % | HEIGHT: 64 IN | RESPIRATION RATE: 18 BRPM | WEIGHT: 195 LBS

## 2023-09-26 DIAGNOSIS — N39.0 URINARY TRACT INFECTION WITHOUT HEMATURIA, SITE UNSPECIFIED: ICD-10-CM

## 2023-09-26 DIAGNOSIS — R42 DIZZINESS: Primary | ICD-10-CM

## 2023-09-26 LAB
ALBUMIN SERPL BCP-MCNC: 3.9 G/DL (ref 3.5–5.2)
ALP SERPL-CCNC: 88 U/L (ref 55–135)
ALT SERPL W/O P-5'-P-CCNC: 36 U/L (ref 10–44)
ANION GAP SERPL CALC-SCNC: 3 MMOL/L (ref 3–11)
AST SERPL-CCNC: 18 U/L (ref 10–40)
BACTERIA #/AREA URNS HPF: ABNORMAL /HPF
BASOPHILS # BLD AUTO: 0.05 K/UL (ref 0–0.2)
BASOPHILS NFR BLD: 0.7 % (ref 0–1.9)
BILIRUB SERPL-MCNC: 0.8 MG/DL (ref 0.1–1)
BILIRUB UR QL STRIP: NEGATIVE
BUN SERPL-MCNC: 25 MG/DL (ref 8–23)
CALCIUM SERPL-MCNC: 9.2 MG/DL (ref 8.7–10.5)
CHLORIDE SERPL-SCNC: 106 MMOL/L (ref 95–110)
CLARITY UR: ABNORMAL
CO2 SERPL-SCNC: 30 MMOL/L (ref 23–29)
COLOR UR: YELLOW
CREAT SERPL-MCNC: 1 MG/DL (ref 0.5–1.4)
DIFFERENTIAL METHOD: ABNORMAL
EOSINOPHIL # BLD AUTO: 0.1 K/UL (ref 0–0.5)
EOSINOPHIL NFR BLD: 1.5 % (ref 0–8)
ERYTHROCYTE [DISTWIDTH] IN BLOOD BY AUTOMATED COUNT: 11.9 % (ref 11.5–14.5)
EST. GFR  (NO RACE VARIABLE): 58 ML/MIN/1.73 M^2
GLUCOSE SERPL-MCNC: 93 MG/DL (ref 70–110)
GLUCOSE UR QL STRIP: NEGATIVE
HCT VFR BLD AUTO: 38.7 % (ref 37–48.5)
HGB BLD-MCNC: 12.6 G/DL (ref 12–16)
HGB UR QL STRIP: NEGATIVE
HYALINE CASTS #/AREA URNS LPF: 6.9 /LPF
IMM GRANULOCYTES # BLD AUTO: 0.02 K/UL (ref 0–0.04)
IMM GRANULOCYTES NFR BLD AUTO: 0.3 % (ref 0–0.5)
KETONES UR QL STRIP: ABNORMAL
LEUKOCYTE ESTERASE UR QL STRIP: ABNORMAL
LYMPHOCYTES # BLD AUTO: 1.8 K/UL (ref 1–4.8)
LYMPHOCYTES NFR BLD: 25.3 % (ref 18–48)
MCH RBC QN AUTO: 29.4 PG (ref 27–31)
MCHC RBC AUTO-ENTMCNC: 32.6 G/DL (ref 32–36)
MCV RBC AUTO: 90 FL (ref 82–98)
MICROSCOPIC COMMENT: ABNORMAL
MONOCYTES # BLD AUTO: 0.7 K/UL (ref 0.3–1)
MONOCYTES NFR BLD: 9.2 % (ref 4–15)
NEUTROPHILS # BLD AUTO: 4.5 K/UL (ref 1.8–7.7)
NEUTROPHILS NFR BLD: 63 % (ref 38–73)
NITRITE UR QL STRIP: POSITIVE
NRBC BLD-RTO: 0 /100 WBC
PH UR STRIP: 6 [PH] (ref 5–8)
PLATELET # BLD AUTO: 295 K/UL (ref 150–450)
PMV BLD AUTO: 8.7 FL (ref 9.2–12.9)
POTASSIUM SERPL-SCNC: 3.5 MMOL/L (ref 3.5–5.1)
PROT SERPL-MCNC: 7.5 G/DL (ref 6–8.4)
PROT UR QL STRIP: NEGATIVE
RBC # BLD AUTO: 4.28 M/UL (ref 4–5.4)
RBC #/AREA URNS HPF: 2 /HPF (ref 0–4)
SODIUM SERPL-SCNC: 139 MMOL/L (ref 136–145)
SP GR UR STRIP: 1.02 (ref 1–1.03)
SQUAMOUS #/AREA URNS HPF: 11 /HPF
URN SPEC COLLECT METH UR: ABNORMAL
UROBILINOGEN UR STRIP-ACNC: 1 EU/DL
WBC # BLD AUTO: 7.18 K/UL (ref 3.9–12.7)
WBC #/AREA URNS HPF: 92 /HPF (ref 0–5)

## 2023-09-26 PROCEDURE — 87088 URINE BACTERIA CULTURE: CPT | Performed by: EMERGENCY MEDICINE

## 2023-09-26 PROCEDURE — 96372 THER/PROPH/DIAG INJ SC/IM: CPT | Performed by: EMERGENCY MEDICINE

## 2023-09-26 PROCEDURE — 81000 URINALYSIS NONAUTO W/SCOPE: CPT | Performed by: EMERGENCY MEDICINE

## 2023-09-26 PROCEDURE — 25000003 PHARM REV CODE 250: Performed by: EMERGENCY MEDICINE

## 2023-09-26 PROCEDURE — 85025 COMPLETE CBC W/AUTO DIFF WBC: CPT | Performed by: EMERGENCY MEDICINE

## 2023-09-26 PROCEDURE — 80053 COMPREHEN METABOLIC PANEL: CPT | Performed by: EMERGENCY MEDICINE

## 2023-09-26 PROCEDURE — 87086 URINE CULTURE/COLONY COUNT: CPT | Performed by: EMERGENCY MEDICINE

## 2023-09-26 PROCEDURE — 63600175 PHARM REV CODE 636 W HCPCS: Performed by: EMERGENCY MEDICINE

## 2023-09-26 PROCEDURE — 99284 EMERGENCY DEPT VISIT MOD MDM: CPT

## 2023-09-26 PROCEDURE — 87077 CULTURE AEROBIC IDENTIFY: CPT | Performed by: EMERGENCY MEDICINE

## 2023-09-26 PROCEDURE — 36415 COLL VENOUS BLD VENIPUNCTURE: CPT | Performed by: EMERGENCY MEDICINE

## 2023-09-26 PROCEDURE — 87186 SC STD MICRODIL/AGAR DIL: CPT | Performed by: EMERGENCY MEDICINE

## 2023-09-26 RX ORDER — CEPHALEXIN 500 MG/1
500 CAPSULE ORAL 4 TIMES DAILY
Qty: 20 CAPSULE | Refills: 0 | Status: SHIPPED | OUTPATIENT
Start: 2023-09-26 | End: 2023-10-01

## 2023-09-26 RX ORDER — CEFTRIAXONE 1 G/1
1 INJECTION, POWDER, FOR SOLUTION INTRAMUSCULAR; INTRAVENOUS
Status: COMPLETED | OUTPATIENT
Start: 2023-09-26 | End: 2023-09-26

## 2023-09-26 RX ORDER — ONDANSETRON 4 MG/1
4 TABLET, ORALLY DISINTEGRATING ORAL
Status: COMPLETED | OUTPATIENT
Start: 2023-09-26 | End: 2023-09-26

## 2023-09-26 RX ADMIN — ONDANSETRON 4 MG: 4 TABLET, ORALLY DISINTEGRATING ORAL at 03:09

## 2023-09-26 RX ADMIN — CEFTRIAXONE SODIUM 1 G: 1 INJECTION, POWDER, FOR SOLUTION INTRAMUSCULAR; INTRAVENOUS at 05:09

## 2023-09-26 NOTE — ED PROVIDER NOTES
"Encounter Date: 9/26/2023       History     Chief Complaint   Patient presents with    Dizziness     "Been suffering with Vertigo, seeing ENT, had crystals in ear aligned."  Dizziness worsening. Used  Meclizine without relief.  Started on statin for cholesterol on 9/8/23     76 yo female with depression, dizziness here via POV for evaluation of dizziness. Has been seen by ENT, without relief. Meclizine without relief. Currently denies dizziness. Mild nausea. No vomiting. No abd pain. No fever/chills. Gradual onset. Longstanding problem.       Review of patient's allergies indicates:   Allergen Reactions    Morphine Itching     Past Medical History:   Diagnosis Date    Anxiety     Arthritis     Cataract     Right Eye    Depression     GERD (gastroesophageal reflux disease)     Glaucoma     Hypertension     Manic behavior     KEV (obstructive sleep apnea)     Pre-op exam 4/11/2022    Thyroid disease      Past Surgical History:   Procedure Laterality Date    CATARACT EXTRACTION  1985    Dr. Jovan Cadena   Left Eye    CORNEAL TRANSPLANT  1993     Dr. Jovan Cadena  Left Eye    PENETRATING KERATOPLASTY      STRABISMUS SURGERY  1999    Dr. Jovan Cadena Left Eye    VITRECTOMY       Family History   Problem Relation Age of Onset    Cancer Mother 76        Colon    Cancer Father 62        prostate    Hypertension Father     Macular degeneration Father     Cancer Sister 9        brain tumor    Diabetes Paternal Aunt     Diabetes Maternal Grandmother      Social History     Tobacco Use    Smoking status: Never    Smokeless tobacco: Never   Substance Use Topics    Alcohol use: Yes     Alcohol/week: 0.0 standard drinks of alcohol    Drug use: No     Review of Systems   Constitutional: Negative.    Respiratory: Negative.     Cardiovascular: Negative.    Gastrointestinal:  Positive for nausea.   Neurological:  Positive for dizziness.   All other systems reviewed and are negative.      Physical Exam     Initial Vitals [09/26/23 " 1512]   BP Pulse Resp Temp SpO2   133/71 87 18 97.6 °F (36.4 °C) 97 %      MAP       --         Physical Exam    Nursing note and vitals reviewed.  Constitutional: She appears well-developed and well-nourished. She is not diaphoretic. No distress.   HENT:   Head: Normocephalic and atraumatic.   Eyes: Conjunctivae are normal. Right eye exhibits no discharge. Left eye exhibits no discharge. No scleral icterus.   Neck: Neck supple.   Normal range of motion.  Cardiovascular:  Normal rate, regular rhythm and intact distal pulses.     Exam reveals no gallop and no friction rub.       No murmur heard.  Pulmonary/Chest: Breath sounds normal. No respiratory distress. She has no wheezes. She has no rales.   Abdominal: Abdomen is soft. Bowel sounds are normal. She exhibits no distension. There is no abdominal tenderness. There is no rebound.   Musculoskeletal:         General: No tenderness or edema. Normal range of motion.      Cervical back: Normal range of motion and neck supple.     Neurological: She is alert and oriented to person, place, and time. She has normal strength and normal reflexes. GCS score is 15. GCS eye subscore is 4. GCS verbal subscore is 5. GCS motor subscore is 6.   Skin: Skin is warm and dry. Capillary refill takes less than 2 seconds.         ED Course   Procedures  Labs Reviewed   CBC W/ AUTO DIFFERENTIAL - Abnormal; Notable for the following components:       Result Value    MPV 8.7 (*)     All other components within normal limits   COMPREHENSIVE METABOLIC PANEL - Abnormal; Notable for the following components:    CO2 30 (*)     BUN 25 (*)     eGFR 58.0 (*)     All other components within normal limits   URINALYSIS, REFLEX TO URINE CULTURE - Abnormal; Notable for the following components:    Appearance, UA Cloudy (*)     Ketones, UA Trace (*)     Nitrite, UA Positive (*)     Leukocytes, UA 2+ (*)     All other components within normal limits    Narrative:     Preferred Collection Type->Urine, Clean  Catch  Specimen Source->Urine   URINALYSIS MICROSCOPIC - Abnormal; Notable for the following components:    WBC, UA 92 (*)     Bacteria Many (*)     Hyaline Casts, UA 6.9 (*)     All other components within normal limits    Narrative:     Preferred Collection Type->Urine, Clean Catch  Specimen Source->Urine   CULTURE, URINE          Imaging Results    None          Medications   ondansetron disintegrating tablet 4 mg (4 mg Oral Given 9/26/23 1548)   cefTRIAXone injection 1 g (1 g Intramuscular Given 9/26/23 1704)     Medical Decision Making  Chronic dizziness. Labs reassuring. UA with UTI. Discussed with PCP, Dr Zhang. No indication for further dizziness eval, has been seen by multiple specialists without relief or certain etiology. Treat UTI.     Problems Addressed:  Urinary tract infection without hematuria, site unspecified: acute illness or injury with systemic symptoms    Amount and/or Complexity of Data Reviewed  Labs: ordered. Decision-making details documented in ED Course.    Risk  Prescription drug management.                               Clinical Impression:   Final diagnoses:  [R42] Dizziness (Primary)  [N39.0] Urinary tract infection without hematuria, site unspecified        ED Disposition Condition    Discharge Stable          ED Prescriptions       Medication Sig Dispense Start Date End Date Auth. Provider    cephALEXin (KEFLEX) 500 MG capsule Take 1 capsule (500 mg total) by mouth 4 (four) times daily. for 5 days 20 capsule 9/26/2023 10/1/2023 He Cotto MD          Follow-up Information       Follow up With Specialties Details Why Contact Info    Kal Zhang Jr., MD Internal Medicine Schedule an appointment as soon as possible for a visit   [address] [place] [id number]  [phone]               He Cotto MD  09/26/23 5928

## 2023-09-29 LAB — BACTERIA UR CULT: ABNORMAL

## 2023-11-13 PROBLEM — R11.2 NAUSEA AND VOMITING: Status: RESOLVED | Noted: 2022-01-10 | Resolved: 2023-11-13

## 2024-02-02 ENCOUNTER — HOSPITAL ENCOUNTER (OUTPATIENT)
Dept: RADIOLOGY | Facility: HOSPITAL | Age: 79
Discharge: HOME | End: 2024-02-02
Attending: NURSE PRACTITIONER
Payer: MEDICARE

## 2024-02-02 DIAGNOSIS — R42 DIZZINESS AND GIDDINESS: ICD-10-CM
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
## 2024-02-02 DIAGNOSIS — R07.81 RIB PAIN: ICD-10-CM

## 2024-02-02 PROCEDURE — 71046 X-RAY EXAM CHEST 2 VIEWS: CPT | Mod: TC

## 2024-02-02 PROCEDURE — 70450 CT HEAD/BRAIN W/O DYE: CPT | Mod: TC

## 2024-02-09 ENCOUNTER — LAB VISIT (OUTPATIENT)
Dept: LAB | Facility: HOSPITAL | Age: 79
End: 2024-02-09
Attending: INTERNAL MEDICINE
Payer: MEDICARE

## 2024-02-09 DIAGNOSIS — E63.0 ESSENTIAL FATTY ACID (EFA) DEFICIENCY: ICD-10-CM

## 2024-02-09 DIAGNOSIS — E78.2 MIXED HYPERLIPIDEMIA: ICD-10-CM

## 2024-02-09 DIAGNOSIS — I10 PRIMARY HYPERTENSION: ICD-10-CM

## 2024-02-09 DIAGNOSIS — E87.6 HYPOKALEMIA: ICD-10-CM

## 2024-02-09 DIAGNOSIS — Z79.899 ENCOUNTER FOR LONG-TERM (CURRENT) USE OF OTHER MEDICATIONS: ICD-10-CM

## 2024-02-09 DIAGNOSIS — E03.9 HYPOTHYROIDISM, UNSPECIFIED TYPE: ICD-10-CM

## 2024-02-09 DIAGNOSIS — D50.8 IRON DEFICIENCY ANEMIA SECONDARY TO INADEQUATE DIETARY IRON INTAKE: ICD-10-CM

## 2024-02-09 DIAGNOSIS — E03.8 OTHER SPECIFIED HYPOTHYROIDISM: ICD-10-CM

## 2024-02-09 LAB
ALBUMIN SERPL BCP-MCNC: 3.6 G/DL (ref 3.5–5.2)
ALP SERPL-CCNC: 130 U/L (ref 55–135)
ALT SERPL W/O P-5'-P-CCNC: 49 U/L (ref 10–44)
ANION GAP SERPL CALC-SCNC: 4 MMOL/L (ref 3–11)
AST SERPL-CCNC: 18 U/L (ref 10–40)
BASOPHILS # BLD AUTO: 0.04 K/UL (ref 0–0.2)
BASOPHILS NFR BLD: 0.6 % (ref 0–1.9)
BILIRUB SERPL-MCNC: 0.9 MG/DL (ref 0.1–1)
BUN SERPL-MCNC: 18 MG/DL (ref 8–23)
CALCIUM SERPL-MCNC: 9 MG/DL (ref 8.7–10.5)
CHLORIDE SERPL-SCNC: 107 MMOL/L (ref 95–110)
CHOLEST SERPL-MCNC: 143 MG/DL (ref 120–199)
CHOLEST/HDLC SERPL: 2.1 {RATIO} (ref 2–5)
CO2 SERPL-SCNC: 29 MMOL/L (ref 23–29)
CREAT SERPL-MCNC: 1.1 MG/DL (ref 0.5–1.4)
DIFFERENTIAL METHOD BLD: ABNORMAL
EOSINOPHIL # BLD AUTO: 0.1 K/UL (ref 0–0.5)
EOSINOPHIL NFR BLD: 1.6 % (ref 0–8)
ERYTHROCYTE [DISTWIDTH] IN BLOOD BY AUTOMATED COUNT: 12 % (ref 11.5–14.5)
EST. GFR  (NO RACE VARIABLE): 51.4 ML/MIN/1.73 M^2
ESTIMATED AVG GLUCOSE: 105 MG/DL (ref 68–131)
GLUCOSE SERPL-MCNC: 95 MG/DL (ref 70–110)
HBA1C MFR BLD: 5.3 % (ref 4–5.6)
HCT VFR BLD AUTO: 37.9 % (ref 37–48.5)
HDLC SERPL-MCNC: 68 MG/DL (ref 40–75)
HDLC SERPL: 47.6 % (ref 20–50)
HGB BLD-MCNC: 11.9 G/DL (ref 12–16)
IMM GRANULOCYTES # BLD AUTO: 0.02 K/UL (ref 0–0.04)
IMM GRANULOCYTES NFR BLD AUTO: 0.3 % (ref 0–0.5)
LDLC SERPL CALC-MCNC: 52.6 MG/DL (ref 63–159)
LYMPHOCYTES # BLD AUTO: 2.2 K/UL (ref 1–4.8)
LYMPHOCYTES NFR BLD: 32 % (ref 18–48)
MAGNESIUM SERPL-MCNC: 2.6 MG/DL (ref 1.6–2.6)
MCH RBC QN AUTO: 29 PG (ref 27–31)
MCHC RBC AUTO-ENTMCNC: 31.4 G/DL (ref 32–36)
MCV RBC AUTO: 92 FL (ref 82–98)
MONOCYTES # BLD AUTO: 0.5 K/UL (ref 0.3–1)
MONOCYTES NFR BLD: 7.7 % (ref 4–15)
NEUTROPHILS # BLD AUTO: 3.9 K/UL (ref 1.8–7.7)
NEUTROPHILS NFR BLD: 57.8 % (ref 38–73)
NONHDLC SERPL-MCNC: 75 MG/DL
NRBC BLD-RTO: 0 /100 WBC
PLATELET # BLD AUTO: 321 K/UL (ref 150–450)
PMV BLD AUTO: 8.9 FL (ref 9.2–12.9)
POTASSIUM SERPL-SCNC: 4 MMOL/L (ref 3.5–5.1)
PROT SERPL-MCNC: 7.4 G/DL (ref 6–8.4)
RBC # BLD AUTO: 4.11 M/UL (ref 4–5.4)
SODIUM SERPL-SCNC: 140 MMOL/L (ref 136–145)
TRIGL SERPL-MCNC: 112 MG/DL (ref 30–150)
WBC # BLD AUTO: 6.76 K/UL (ref 3.9–12.7)

## 2024-02-09 PROCEDURE — 83036 HEMOGLOBIN GLYCOSYLATED A1C: CPT | Performed by: INTERNAL MEDICINE

## 2024-02-09 PROCEDURE — 36415 COLL VENOUS BLD VENIPUNCTURE: CPT | Performed by: INTERNAL MEDICINE

## 2024-02-09 PROCEDURE — 83735 ASSAY OF MAGNESIUM: CPT | Performed by: INTERNAL MEDICINE

## 2024-02-09 PROCEDURE — 85025 COMPLETE CBC W/AUTO DIFF WBC: CPT | Performed by: INTERNAL MEDICINE

## 2024-02-09 PROCEDURE — 80053 COMPREHEN METABOLIC PANEL: CPT | Performed by: INTERNAL MEDICINE

## 2024-02-09 PROCEDURE — 80061 LIPID PANEL: CPT | Performed by: INTERNAL MEDICINE

## 2024-03-08 PROBLEM — F33.3 SEVERE EPISODE OF RECURRENT MAJOR DEPRESSIVE DISORDER, WITH PSYCHOTIC FEATURES: Status: ACTIVE | Noted: 2024-03-08

## 2024-06-10 PROBLEM — Z00.00 ROUTINE GENERAL MEDICAL EXAMINATION AT A HEALTH CARE FACILITY: Status: RESOLVED | Noted: 2021-10-15 | Resolved: 2024-06-10

## 2024-07-18 PROBLEM — M79.602 LEFT ARM PAIN: Status: ACTIVE | Noted: 2024-07-18

## 2024-09-24 PROBLEM — E66.01 SEVERE OBESITY (BMI 35.0-39.9) WITH COMORBIDITY: Status: ACTIVE | Noted: 2021-11-29

## 2024-09-24 PROBLEM — R06.2 WHEEZING: Status: ACTIVE | Noted: 2024-09-24

## 2025-05-15 PROBLEM — R73.01 IFG (IMPAIRED FASTING GLUCOSE): Status: ACTIVE | Noted: 2025-05-15

## 2025-08-01 PROBLEM — R06.02 SOB (SHORTNESS OF BREATH): Status: ACTIVE | Noted: 2021-03-29

## 2025-08-12 ENCOUNTER — HOSPITAL ENCOUNTER (OUTPATIENT)
Dept: RADIOLOGY | Facility: HOSPITAL | Age: 80
Discharge: HOME | End: 2025-08-12
Attending: NURSE PRACTITIONER
Payer: MEDICARE

## 2025-08-12 DIAGNOSIS — R06.09 DYSPNEA ON EXERTION: ICD-10-CM

## 2025-08-12 PROCEDURE — 71250 CT THORAX DX C-: CPT | Mod: TC
